# Patient Record
Sex: MALE | Race: BLACK OR AFRICAN AMERICAN | Employment: OTHER | URBAN - METROPOLITAN AREA
[De-identification: names, ages, dates, MRNs, and addresses within clinical notes are randomized per-mention and may not be internally consistent; named-entity substitution may affect disease eponyms.]

---

## 2018-01-01 ENCOUNTER — APPOINTMENT (OUTPATIENT)
Dept: CT IMAGING | Age: 83
DRG: 987 | End: 2018-01-01
Payer: MEDICARE

## 2018-01-01 ENCOUNTER — HOSPITAL ENCOUNTER (INPATIENT)
Age: 83
LOS: 2 days | DRG: 987 | End: 2018-08-13
Attending: EMERGENCY MEDICINE | Admitting: SURGERY
Payer: MEDICARE

## 2018-01-01 ENCOUNTER — APPOINTMENT (OUTPATIENT)
Dept: GENERAL RADIOLOGY | Age: 83
DRG: 987 | End: 2018-01-01
Payer: MEDICARE

## 2018-01-01 VITALS
DIASTOLIC BLOOD PRESSURE: 59 MMHG | TEMPERATURE: 98.1 F | SYSTOLIC BLOOD PRESSURE: 118 MMHG | OXYGEN SATURATION: 97 % | HEART RATE: 74 BPM | RESPIRATION RATE: 27 BRPM | WEIGHT: 207.45 LBS

## 2018-01-01 DIAGNOSIS — Z86.74 HISTORY OF CARDIAC ARREST: ICD-10-CM

## 2018-01-01 DIAGNOSIS — S12.9XXA CLOSED FRACTURE OF CERVICAL VERTEBRA, UNSPECIFIED CERVICAL VERTEBRAL LEVEL, INITIAL ENCOUNTER (HCC): Primary | ICD-10-CM

## 2018-01-01 LAB
-: NORMAL
ABO/RH: NORMAL
ABSOLUTE EOS #: <0.03 K/UL (ref 0–0.44)
ABSOLUTE EOS #: <0.03 K/UL (ref 0–0.44)
ABSOLUTE IMMATURE GRANULOCYTE: 0.05 K/UL (ref 0–0.3)
ABSOLUTE IMMATURE GRANULOCYTE: 0.08 K/UL (ref 0–0.3)
ABSOLUTE LYMPH #: 0.81 K/UL (ref 1.1–3.7)
ABSOLUTE LYMPH #: 0.85 K/UL (ref 1.1–3.7)
ABSOLUTE MONO #: 0.63 K/UL (ref 0.1–1.2)
ABSOLUTE MONO #: 0.85 K/UL (ref 0.1–1.2)
ACT TEG: 113 SEC (ref 86–118)
ALLEN TEST: ABNORMAL
ALLEN TEST: POSITIVE
AMORPHOUS: NORMAL
AMPHETAMINE SCREEN URINE: NEGATIVE
ANGLE, RAPID TEG: 78.5 DEG (ref 64–80)
ANION GAP SERPL CALCULATED.3IONS-SCNC: 12 MMOL/L (ref 9–17)
ANION GAP SERPL CALCULATED.3IONS-SCNC: 8 MMOL/L (ref 9–17)
ANION GAP SERPL CALCULATED.3IONS-SCNC: 9 MMOL/L (ref 9–17)
ANTIBODY SCREEN: NEGATIVE
ARM BAND NUMBER: NORMAL
BACTERIA: NORMAL
BARBITURATE SCREEN URINE: NEGATIVE
BASOPHILS # BLD: 0 % (ref 0–2)
BASOPHILS # BLD: 0 % (ref 0–2)
BASOPHILS ABSOLUTE: 0.03 K/UL (ref 0–0.2)
BASOPHILS ABSOLUTE: <0.03 K/UL (ref 0–0.2)
BENZODIAZEPINE SCREEN, URINE: POSITIVE
BILIRUBIN URINE: NEGATIVE
BLD PROD TYP BPU: NORMAL
BLOOD BANK SPECIMEN: ABNORMAL
BNP INTERPRETATION: NORMAL
BUN BLDV-MCNC: 13 MG/DL (ref 8–23)
BUN BLDV-MCNC: 20 MG/DL (ref 8–23)
BUN BLDV-MCNC: 8 MG/DL (ref 8–23)
BUN/CREAT BLD: ABNORMAL (ref 9–20)
BUN/CREAT BLD: ABNORMAL (ref 9–20)
BUPRENORPHINE URINE: ABNORMAL
CALCIUM SERPL-MCNC: 8 MG/DL (ref 8.6–10.4)
CALCIUM SERPL-MCNC: 8.3 MG/DL (ref 8.6–10.4)
CANNABINOID SCREEN URINE: NEGATIVE
CARBOXYHEMOGLOBIN: ABNORMAL % (ref 0–5)
CASTS UA: NORMAL /LPF (ref 0–8)
CHLORIDE BLD-SCNC: 103 MMOL/L (ref 98–107)
CHLORIDE BLD-SCNC: 106 MMOL/L (ref 98–107)
CHLORIDE BLD-SCNC: 108 MMOL/L (ref 98–107)
CO2: 21 MMOL/L (ref 20–31)
CO2: 24 MMOL/L (ref 20–31)
CO2: 25 MMOL/L (ref 20–31)
COCAINE METABOLITE, URINE: NEGATIVE
COLOR: YELLOW
COMMENT UA: ABNORMAL
CREAT SERPL-MCNC: 0.75 MG/DL (ref 0.7–1.2)
CREAT SERPL-MCNC: 0.82 MG/DL (ref 0.7–1.2)
CREAT SERPL-MCNC: 0.94 MG/DL (ref 0.7–1.2)
CROSSMATCH RESULT: NORMAL
CRYSTALS, UA: NORMAL /HPF
DIFFERENTIAL TYPE: ABNORMAL
DIFFERENTIAL TYPE: ABNORMAL
DISPENSE STATUS BLOOD BANK: NORMAL
EKG ATRIAL RATE: 115 BPM
EKG Q-T INTERVAL: 344 MS
EKG QRS DURATION: 78 MS
EKG QTC CALCULATION (BAZETT): 467 MS
EKG R AXIS: 72 DEGREES
EKG T AXIS: 36 DEGREES
EKG VENTRICULAR RATE: 111 BPM
EOSINOPHILS RELATIVE PERCENT: 0 % (ref 1–4)
EOSINOPHILS RELATIVE PERCENT: 0 % (ref 1–4)
EPITHELIAL CELLS UA: NORMAL /HPF (ref 0–5)
EPL-TEG: 0.1 % (ref 0–15)
ETHANOL PERCENT: <0.01 %
ETHANOL: <10 MG/DL
EXPIRATION DATE: NORMAL
FIO2: 100
FIO2: 30
FIO2: 80
FIO2: ABNORMAL
GFR AFRICAN AMERICAN: >60 ML/MIN
GFR AFRICAN AMERICAN: >60 ML/MIN
GFR AFRICAN AMERICAN: ABNORMAL ML/MIN
GFR NON-AFRICAN AMERICAN: >60 ML/MIN
GFR NON-AFRICAN AMERICAN: >60 ML/MIN
GFR NON-AFRICAN AMERICAN: ABNORMAL ML/MIN
GFR SERPL CREATININE-BSD FRML MDRD: ABNORMAL ML/MIN/{1.73_M2}
GLUCOSE BLD-MCNC: 110 MG/DL (ref 70–99)
GLUCOSE BLD-MCNC: 120 MG/DL (ref 70–99)
GLUCOSE BLD-MCNC: 148 MG/DL (ref 70–99)
GLUCOSE URINE: NEGATIVE
HCO3 VENOUS: 22 MMOL/L (ref 24–30)
HCT VFR BLD CALC: 32.9 % (ref 40.7–50.3)
HCT VFR BLD CALC: 35.7 % (ref 40.7–50.3)
HCT VFR BLD CALC: 37.9 % (ref 40.7–50.3)
HEMOGLOBIN: 10.4 G/DL (ref 13–17)
HEMOGLOBIN: 11.2 G/DL (ref 13–17)
HEMOGLOBIN: 12.1 G/DL (ref 13–17)
HEPARIN THERAPY: ABNORMAL
IMMATURE GRANULOCYTES: 1 %
IMMATURE GRANULOCYTES: 1 %
INR BLD: 1
KETONES, URINE: NEGATIVE
KINETICS RAPID TEG: 0.9 MIN (ref 1–2)
LEUKOCYTE ESTERASE, URINE: NEGATIVE
LV EF: 55 %
LVEF MODALITY: NORMAL
LY30 (LYSIS) TEG: 0.1 % (ref 0–8)
LYMPHOCYTES # BLD: 6 % (ref 24–43)
LYMPHOCYTES # BLD: 9 % (ref 24–43)
MA(MAX CLOT) RAPID TEG: 72.3 MM (ref 52–71)
MCH RBC QN AUTO: 28.8 PG (ref 25.2–33.5)
MCH RBC QN AUTO: 29.2 PG (ref 25.2–33.5)
MCH RBC QN AUTO: 29.9 PG (ref 25.2–33.5)
MCHC RBC AUTO-ENTMCNC: 31.4 G/DL (ref 28.4–34.8)
MCHC RBC AUTO-ENTMCNC: 31.6 G/DL (ref 28.4–34.8)
MCHC RBC AUTO-ENTMCNC: 31.9 G/DL (ref 28.4–34.8)
MCV RBC AUTO: 91.1 FL (ref 82.6–102.9)
MCV RBC AUTO: 91.3 FL (ref 82.6–102.9)
MCV RBC AUTO: 95.2 FL (ref 82.6–102.9)
MDMA URINE: ABNORMAL
METHADONE SCREEN, URINE: NEGATIVE
METHAMPHETAMINE, URINE: ABNORMAL
METHEMOGLOBIN: ABNORMAL % (ref 0–1.5)
MODE: ABNORMAL
MONOCYTES # BLD: 7 % (ref 3–12)
MONOCYTES # BLD: 7 % (ref 3–12)
MRSA, DNA, NASAL: NORMAL
MUCUS: NORMAL
MYOGLOBIN: 315 NG/ML (ref 28–72)
MYOGLOBIN: 360 NG/ML (ref 28–72)
MYOGLOBIN: 449 NG/ML (ref 28–72)
NEGATIVE BASE EXCESS, ART: 1 (ref 0–2)
NEGATIVE BASE EXCESS, ART: 3 (ref 0–2)
NEGATIVE BASE EXCESS, ART: ABNORMAL (ref 0–2)
NEGATIVE BASE EXCESS, VEN: ABNORMAL MMOL/L (ref 0–2)
NITRITE, URINE: NEGATIVE
NOTIFICATION TIME: ABNORMAL
NOTIFICATION: ABNORMAL
NRBC AUTOMATED: 0 PER 100 WBC
O2 DEVICE/FLOW/%: ABNORMAL
O2 SAT, VEN: ABNORMAL % (ref 60–85)
OPIATES, URINE: NEGATIVE
OTHER OBSERVATIONS UA: NORMAL
OXYCODONE SCREEN URINE: NEGATIVE
OXYHEMOGLOBIN: ABNORMAL % (ref 95–98)
PARTIAL THROMBOPLASTIN TIME: 17 SEC (ref 20.5–30.5)
PATIENT TEMP: 37
PATIENT TEMP: ABNORMAL
PCO2, VEN, TEMP ADJ: ABNORMAL MMHG (ref 39–55)
PCO2, VEN: 41.3 (ref 39–55)
PDW BLD-RTO: 15.4 % (ref 11.8–14.4)
PDW BLD-RTO: 16.4 % (ref 11.8–14.4)
PDW BLD-RTO: 16.8 % (ref 11.8–14.4)
PEEP/CPAP: ABNORMAL
PH UA: 5.5 (ref 5–8)
PH VENOUS: 7.35 (ref 7.32–7.42)
PH, VEN, TEMP ADJ: ABNORMAL (ref 7.32–7.42)
PHENCYCLIDINE, URINE: NEGATIVE
PLATELET # BLD: 102 K/UL (ref 138–453)
PLATELET # BLD: 150 K/UL (ref 138–453)
PLATELET # BLD: 174 K/UL (ref 138–453)
PLATELET ESTIMATE: ABNORMAL
PLATELET ESTIMATE: ABNORMAL
PMV BLD AUTO: 10.1 FL (ref 8.1–13.5)
PMV BLD AUTO: 10.3 FL (ref 8.1–13.5)
PMV BLD AUTO: 9.9 FL (ref 8.1–13.5)
PO2, VEN, TEMP ADJ: ABNORMAL MMHG (ref 30–50)
PO2, VEN: 69 (ref 30–50)
POC HCO3: 23.8 MMOL/L (ref 21–28)
POC HCO3: 25.4 MMOL/L (ref 21–28)
POC HCO3: 26.2 MMOL/L (ref 21–28)
POC O2 SATURATION: 100 % (ref 94–98)
POC O2 SATURATION: 94 % (ref 94–98)
POC O2 SATURATION: 99 % (ref 94–98)
POC PCO2 TEMP: ABNORMAL MM HG
POC PCO2: 36.6 MM HG (ref 35–48)
POC PCO2: 48.6 MM HG (ref 35–48)
POC PCO2: 49.5 MM HG (ref 35–48)
POC PH TEMP: ABNORMAL
POC PH: 7.29 (ref 7.35–7.45)
POC PH: 7.33 (ref 7.35–7.45)
POC PH: 7.46 (ref 7.35–7.45)
POC PO2 TEMP: ABNORMAL MM HG
POC PO2: 133.2 MM HG (ref 83–108)
POC PO2: 223.4 MM HG (ref 83–108)
POC PO2: 68 MM HG (ref 83–108)
POSITIVE BASE EXCESS, ART: 2 (ref 0–3)
POSITIVE BASE EXCESS, ART: ABNORMAL (ref 0–3)
POSITIVE BASE EXCESS, ART: ABNORMAL (ref 0–3)
POSITIVE BASE EXCESS, VEN: ABNORMAL MMOL/L (ref 0–2)
POTASSIUM SERPL-SCNC: 4.3 MMOL/L (ref 3.7–5.3)
POTASSIUM SERPL-SCNC: 4.7 MMOL/L (ref 3.7–5.3)
POTASSIUM SERPL-SCNC: 4.8 MMOL/L (ref 3.7–5.3)
PRO-BNP: 257 PG/ML
PROPOXYPHENE, URINE: ABNORMAL
PROTEIN UA: ABNORMAL
PROTHROMBIN TIME: 10.9 SEC (ref 9–12)
PSV: ABNORMAL
PT. POSITION: ABNORMAL
RBC # BLD: 3.61 M/UL (ref 4.21–5.77)
RBC # BLD: 3.75 M/UL (ref 4.21–5.77)
RBC # BLD: 4.15 M/UL (ref 4.21–5.77)
RBC # BLD: ABNORMAL 10*6/UL
RBC # BLD: ABNORMAL 10*6/UL
RBC UA: NORMAL /HPF (ref 0–4)
REACTION TIME RAPID TEG: 0.7 MIN (ref 0–1)
RENAL EPITHELIAL, UA: NORMAL /HPF
RESPIRATORY RATE: ABNORMAL
SAMPLE SITE: ABNORMAL
SEG NEUTROPHILS: 83 % (ref 36–65)
SEG NEUTROPHILS: 86 % (ref 36–65)
SEGMENTED NEUTROPHILS ABSOLUTE COUNT: 11.19 K/UL (ref 1.5–8.1)
SEGMENTED NEUTROPHILS ABSOLUTE COUNT: 7.68 K/UL (ref 1.5–8.1)
SET RATE: ABNORMAL
SODIUM BLD-SCNC: 136 MMOL/L (ref 135–144)
SODIUM BLD-SCNC: 139 MMOL/L (ref 135–144)
SODIUM BLD-SCNC: 141 MMOL/L (ref 135–144)
SPECIFIC GRAVITY UA: 1.08 (ref 1–1.03)
SPECIMEN DESCRIPTION: NORMAL
TCO2 (CALC), ART: 25 MMOL/L (ref 22–29)
TCO2 (CALC), ART: 27 MMOL/L (ref 22–29)
TCO2 (CALC), ART: 27 MMOL/L (ref 22–29)
TEG COMMENT: ABNORMAL
TEST INFORMATION: ABNORMAL
TEXT FOR RESPIRATORY: ABNORMAL
TOTAL CK: 292 U/L (ref 39–308)
TOTAL CK: 302 U/L (ref 39–308)
TOTAL CK: 351 U/L (ref 39–308)
TOTAL HB: ABNORMAL G/DL (ref 12–16)
TOTAL RATE: ABNORMAL
TRANSFUSION STATUS: NORMAL
TRICHOMONAS: NORMAL
TRICYCLIC ANTIDEPRESSANTS, UR: ABNORMAL
TROPONIN INTERP: NORMAL
TROPONIN T: <0.03 NG/ML
TURBIDITY: CLEAR
UNIT DIVISION: 0
UNIT NUMBER: NORMAL
UNIT TAG COMMENT: NORMAL
URINE HGB: ABNORMAL
UROBILINOGEN, URINE: NORMAL
VT: ABNORMAL
WBC # BLD: 13 K/UL (ref 3.5–11.3)
WBC # BLD: 13.3 K/UL (ref 3.5–11.3)
WBC # BLD: 9.2 K/UL (ref 3.5–11.3)
WBC # BLD: ABNORMAL 10*3/UL
WBC # BLD: ABNORMAL 10*3/UL
WBC UA: NORMAL /HPF (ref 0–5)
YEAST: NORMAL

## 2018-01-01 PROCEDURE — S0028 INJECTION, FAMOTIDINE, 20 MG: HCPCS | Performed by: STUDENT IN AN ORGANIZED HEALTH CARE EDUCATION/TRAINING PROGRAM

## 2018-01-01 PROCEDURE — 82803 BLOOD GASES ANY COMBINATION: CPT

## 2018-01-01 PROCEDURE — 6360000004 HC RX CONTRAST MEDICATION: Performed by: EMERGENCY MEDICINE

## 2018-01-01 PROCEDURE — 82947 ASSAY GLUCOSE BLOOD QUANT: CPT

## 2018-01-01 PROCEDURE — 84703 CHORIONIC GONADOTROPIN ASSAY: CPT

## 2018-01-01 PROCEDURE — 6360000002 HC RX W HCPCS: Performed by: FAMILY MEDICINE

## 2018-01-01 PROCEDURE — 83874 ASSAY OF MYOGLOBIN: CPT

## 2018-01-01 PROCEDURE — 93325 DOPPLER ECHO COLOR FLOW MAPG: CPT

## 2018-01-01 PROCEDURE — 70450 CT HEAD/BRAIN W/O DYE: CPT

## 2018-01-01 PROCEDURE — 84484 ASSAY OF TROPONIN QUANT: CPT

## 2018-01-01 PROCEDURE — 72131 CT LUMBAR SPINE W/O DYE: CPT

## 2018-01-01 PROCEDURE — 94003 VENT MGMT INPAT SUBQ DAY: CPT

## 2018-01-01 PROCEDURE — 94762 N-INVAS EAR/PLS OXIMTRY CONT: CPT

## 2018-01-01 PROCEDURE — 82805 BLOOD GASES W/O2 SATURATION: CPT

## 2018-01-01 PROCEDURE — 85210 CLOT FACTOR II PROTHROM SPEC: CPT

## 2018-01-01 PROCEDURE — 2580000003 HC RX 258: Performed by: STUDENT IN AN ORGANIZED HEALTH CARE EDUCATION/TRAINING PROGRAM

## 2018-01-01 PROCEDURE — 86850 RBC ANTIBODY SCREEN: CPT

## 2018-01-01 PROCEDURE — 71045 X-RAY EXAM CHEST 1 VIEW: CPT

## 2018-01-01 PROCEDURE — 36620 INSERTION CATHETER ARTERY: CPT

## 2018-01-01 PROCEDURE — 74177 CT ABD & PELVIS W/CONTRAST: CPT

## 2018-01-01 PROCEDURE — 85610 PROTHROMBIN TIME: CPT

## 2018-01-01 PROCEDURE — G0390 TRAUMA RESPONS W/HOSP CRITI: HCPCS

## 2018-01-01 PROCEDURE — 86927 PLASMA FRESH FROZEN: CPT

## 2018-01-01 PROCEDURE — 6370000000 HC RX 637 (ALT 250 FOR IP): Performed by: STUDENT IN AN ORGANIZED HEALTH CARE EDUCATION/TRAINING PROGRAM

## 2018-01-01 PROCEDURE — 82550 ASSAY OF CK (CPK): CPT

## 2018-01-01 PROCEDURE — 36430 TRANSFUSION BLD/BLD COMPNT: CPT

## 2018-01-01 PROCEDURE — 85027 COMPLETE CBC AUTOMATED: CPT

## 2018-01-01 PROCEDURE — 86920 COMPATIBILITY TEST SPIN: CPT

## 2018-01-01 PROCEDURE — 80048 BASIC METABOLIC PNL TOTAL CA: CPT

## 2018-01-01 PROCEDURE — 94002 VENT MGMT INPAT INIT DAY: CPT

## 2018-01-01 PROCEDURE — 99285 EMERGENCY DEPT VISIT HI MDM: CPT

## 2018-01-01 PROCEDURE — 84520 ASSAY OF UREA NITROGEN: CPT

## 2018-01-01 PROCEDURE — 94770 HC ETCO2 MONITOR DAILY: CPT

## 2018-01-01 PROCEDURE — 99221 1ST HOSP IP/OBS SF/LOW 40: CPT | Performed by: NEUROLOGICAL SURGERY

## 2018-01-01 PROCEDURE — 0W9B30Z DRAINAGE OF LEFT PLEURAL CAVITY WITH DRAINAGE DEVICE, PERCUTANEOUS APPROACH: ICD-10-PCS | Performed by: SURGERY

## 2018-01-01 PROCEDURE — 0W9930Z DRAINAGE OF RIGHT PLEURAL CAVITY WITH DRAINAGE DEVICE, PERCUTANEOUS APPROACH: ICD-10-PCS | Performed by: SURGERY

## 2018-01-01 PROCEDURE — 36415 COLL VENOUS BLD VENIPUNCTURE: CPT

## 2018-01-01 PROCEDURE — 02H633Z INSERTION OF INFUSION DEVICE INTO RIGHT ATRIUM, PERCUTANEOUS APPROACH: ICD-10-PCS | Performed by: SURGERY

## 2018-01-01 PROCEDURE — 2000000000 HC ICU R&B

## 2018-01-01 PROCEDURE — 80307 DRUG TEST PRSMV CHEM ANLYZR: CPT

## 2018-01-01 PROCEDURE — 6360000002 HC RX W HCPCS: Performed by: STUDENT IN AN ORGANIZED HEALTH CARE EDUCATION/TRAINING PROGRAM

## 2018-01-01 PROCEDURE — 2500000003 HC RX 250 WO HCPCS: Performed by: STUDENT IN AN ORGANIZED HEALTH CARE EDUCATION/TRAINING PROGRAM

## 2018-01-01 PROCEDURE — 72128 CT CHEST SPINE W/O DYE: CPT

## 2018-01-01 PROCEDURE — 36600 WITHDRAWAL OF ARTERIAL BLOOD: CPT

## 2018-01-01 PROCEDURE — 06HY33Z INSERTION OF INFUSION DEVICE INTO LOWER VEIN, PERCUTANEOUS APPROACH: ICD-10-PCS | Performed by: SURGERY

## 2018-01-01 PROCEDURE — 5A1945Z RESPIRATORY VENTILATION, 24-96 CONSECUTIVE HOURS: ICD-10-PCS | Performed by: SURGERY

## 2018-01-01 PROCEDURE — 99497 ADVNCD CARE PLAN 30 MIN: CPT | Performed by: FAMILY MEDICINE

## 2018-01-01 PROCEDURE — 80051 ELECTROLYTE PANEL: CPT

## 2018-01-01 PROCEDURE — 82565 ASSAY OF CREATININE: CPT

## 2018-01-01 PROCEDURE — 0W9G0ZX DRAINAGE OF PERITONEAL CAVITY, OPEN APPROACH, DIAGNOSTIC: ICD-10-PCS | Performed by: SURGERY

## 2018-01-01 PROCEDURE — 85025 COMPLETE CBC W/AUTO DIFF WBC: CPT

## 2018-01-01 PROCEDURE — 32551 INSERTION OF CHEST TUBE: CPT

## 2018-01-01 PROCEDURE — P9016 RBC LEUKOCYTES REDUCED: HCPCS

## 2018-01-01 PROCEDURE — 93320 DOPPLER ECHO COMPLETE: CPT

## 2018-01-01 PROCEDURE — 85730 THROMBOPLASTIN TIME PARTIAL: CPT

## 2018-01-01 PROCEDURE — 85390 FIBRINOLYSINS SCREEN I&R: CPT

## 2018-01-01 PROCEDURE — 71260 CT THORAX DX C+: CPT

## 2018-01-01 PROCEDURE — 81001 URINALYSIS AUTO W/SCOPE: CPT

## 2018-01-01 PROCEDURE — 6360000002 HC RX W HCPCS

## 2018-01-01 PROCEDURE — 93308 TTE F-UP OR LMTD: CPT

## 2018-01-01 PROCEDURE — 36556 INSERT NON-TUNNEL CV CATH: CPT

## 2018-01-01 PROCEDURE — 87641 MR-STAPH DNA AMP PROBE: CPT

## 2018-01-01 PROCEDURE — 36569 INSJ PICC 5 YR+ W/O IMAGING: CPT

## 2018-01-01 PROCEDURE — 83880 ASSAY OF NATRIURETIC PEPTIDE: CPT

## 2018-01-01 PROCEDURE — 86901 BLOOD TYPING SEROLOGIC RH(D): CPT

## 2018-01-01 PROCEDURE — 70498 CT ANGIOGRAPHY NECK: CPT

## 2018-01-01 PROCEDURE — 93005 ELECTROCARDIOGRAM TRACING: CPT

## 2018-01-01 PROCEDURE — G0480 DRUG TEST DEF 1-7 CLASSES: HCPCS

## 2018-01-01 PROCEDURE — 72125 CT NECK SPINE W/O DYE: CPT

## 2018-01-01 PROCEDURE — 86900 BLOOD TYPING SEROLOGIC ABO: CPT

## 2018-01-01 PROCEDURE — P9017 PLASMA 1 DONOR FRZ W/IN 8 HR: HCPCS

## 2018-01-01 PROCEDURE — 99221 1ST HOSP IP/OBS SF/LOW 40: CPT | Performed by: FAMILY MEDICINE

## 2018-01-01 RX ORDER — SODIUM CHLORIDE, SODIUM LACTATE, POTASSIUM CHLORIDE, CALCIUM CHLORIDE 600; 310; 30; 20 MG/100ML; MG/100ML; MG/100ML; MG/100ML
INJECTION, SOLUTION INTRAVENOUS CONTINUOUS
Status: DISCONTINUED | OUTPATIENT
Start: 2018-01-01 | End: 2018-01-01

## 2018-01-01 RX ORDER — METOPROLOL SUCCINATE 25 MG/1
25 TABLET, EXTENDED RELEASE ORAL DAILY
COMMUNITY

## 2018-01-01 RX ORDER — DOPAMINE HYDROCHLORIDE 160 MG/100ML
2.5 INJECTION, SOLUTION INTRAVENOUS CONTINUOUS
Status: DISCONTINUED | OUTPATIENT
Start: 2018-01-01 | End: 2018-01-01

## 2018-01-01 RX ORDER — MORPHINE SULFATE 4 MG/ML
4 INJECTION, SOLUTION INTRAMUSCULAR; INTRAVENOUS
Status: DISCONTINUED | OUTPATIENT
Start: 2018-01-01 | End: 2018-01-01 | Stop reason: HOSPADM

## 2018-01-01 RX ORDER — CHLORHEXIDINE GLUCONATE 0.12 MG/ML
15 RINSE ORAL 2 TIMES DAILY
Status: DISCONTINUED | OUTPATIENT
Start: 2018-01-01 | End: 2018-01-01

## 2018-01-01 RX ORDER — ATROPINE SULFATE 10 MG/ML
2 SOLUTION/ DROPS OPHTHALMIC EVERY 4 HOURS PRN
Status: DISCONTINUED | OUTPATIENT
Start: 2018-01-01 | End: 2018-01-01 | Stop reason: HOSPADM

## 2018-01-01 RX ORDER — ACETAMINOPHEN 325 MG/1
650 TABLET ORAL EVERY 4 HOURS PRN
Status: DISCONTINUED | OUTPATIENT
Start: 2018-01-01 | End: 2018-01-01 | Stop reason: HOSPADM

## 2018-01-01 RX ORDER — SODIUM CHLORIDE, SODIUM LACTATE, POTASSIUM CHLORIDE, CALCIUM CHLORIDE 600; 310; 30; 20 MG/100ML; MG/100ML; MG/100ML; MG/100ML
INJECTION, SOLUTION INTRAVENOUS CONTINUOUS
Status: DISCONTINUED | OUTPATIENT
Start: 2018-01-01 | End: 2018-01-01 | Stop reason: HOSPADM

## 2018-01-01 RX ORDER — SODIUM CHLORIDE 0.9 % (FLUSH) 0.9 %
10 SYRINGE (ML) INJECTION PRN
Status: DISCONTINUED | OUTPATIENT
Start: 2018-01-01 | End: 2018-01-01 | Stop reason: HOSPADM

## 2018-01-01 RX ORDER — MORPHINE SULFATE 2 MG/ML
1 INJECTION, SOLUTION INTRAMUSCULAR; INTRAVENOUS
Status: DISCONTINUED | OUTPATIENT
Start: 2018-01-01 | End: 2018-01-01 | Stop reason: HOSPADM

## 2018-01-01 RX ORDER — DOPAMINE HYDROCHLORIDE 160 MG/100ML
INJECTION, SOLUTION INTRAVENOUS
Status: DISPENSED
Start: 2018-01-01 | End: 2018-01-01

## 2018-01-01 RX ORDER — GINSENG 100 MG
CAPSULE ORAL PRN
Status: DISCONTINUED | OUTPATIENT
Start: 2018-01-01 | End: 2018-01-01 | Stop reason: HOSPADM

## 2018-01-01 RX ORDER — ASPIRIN 81 MG/1
81 TABLET, CHEWABLE ORAL DAILY
COMMUNITY

## 2018-01-01 RX ORDER — SODIUM CHLORIDE 0.9 % (FLUSH) 0.9 %
10 SYRINGE (ML) INJECTION EVERY 12 HOURS SCHEDULED
Status: DISCONTINUED | OUTPATIENT
Start: 2018-01-01 | End: 2018-01-01 | Stop reason: HOSPADM

## 2018-01-01 RX ORDER — LORAZEPAM 2 MG/ML
0.5 INJECTION INTRAMUSCULAR
Status: DISCONTINUED | OUTPATIENT
Start: 2018-01-01 | End: 2018-01-01 | Stop reason: HOSPADM

## 2018-01-01 RX ORDER — FOLIC ACID 1 MG/1
1 TABLET ORAL DAILY
COMMUNITY

## 2018-01-01 RX ORDER — LORAZEPAM 2 MG/ML
1 INJECTION INTRAMUSCULAR
Status: DISCONTINUED | OUTPATIENT
Start: 2018-01-01 | End: 2018-01-01 | Stop reason: HOSPADM

## 2018-01-01 RX ORDER — MORPHINE SULFATE 2 MG/ML
2 INJECTION, SOLUTION INTRAMUSCULAR; INTRAVENOUS
Status: DISCONTINUED | OUTPATIENT
Start: 2018-01-01 | End: 2018-01-01 | Stop reason: HOSPADM

## 2018-01-01 RX ORDER — ONDANSETRON 2 MG/ML
4 INJECTION INTRAMUSCULAR; INTRAVENOUS EVERY 6 HOURS PRN
Status: DISCONTINUED | OUTPATIENT
Start: 2018-01-01 | End: 2018-01-01 | Stop reason: HOSPADM

## 2018-01-01 RX ORDER — GLYCOPYRROLATE 0.2 MG/ML
0.1 INJECTION INTRAMUSCULAR; INTRAVENOUS EVERY 4 HOURS PRN
Status: DISCONTINUED | OUTPATIENT
Start: 2018-01-01 | End: 2018-01-01 | Stop reason: HOSPADM

## 2018-01-01 RX ORDER — OXYCODONE HYDROCHLORIDE 5 MG/1
5 TABLET ORAL EVERY 4 HOURS PRN
Status: DISCONTINUED | OUTPATIENT
Start: 2018-01-01 | End: 2018-01-01 | Stop reason: HOSPADM

## 2018-01-01 RX ORDER — METOPROLOL TARTRATE 5 MG/5ML
2.5 INJECTION INTRAVENOUS EVERY 6 HOURS PRN
Status: DISCONTINUED | OUTPATIENT
Start: 2018-01-01 | End: 2018-01-01 | Stop reason: HOSPADM

## 2018-01-01 RX ORDER — MIDAZOLAM HYDROCHLORIDE 1 MG/ML
INJECTION INTRAMUSCULAR; INTRAVENOUS
Status: DISPENSED
Start: 2018-01-01 | End: 2018-01-01

## 2018-01-01 RX ORDER — FENTANYL CITRATE 50 UG/ML
INJECTION, SOLUTION INTRAMUSCULAR; INTRAVENOUS
Status: COMPLETED
Start: 2018-01-01 | End: 2018-01-01

## 2018-01-01 RX ORDER — FENTANYL CITRATE 50 UG/ML
50 INJECTION, SOLUTION INTRAMUSCULAR; INTRAVENOUS
Status: DISCONTINUED | OUTPATIENT
Start: 2018-01-01 | End: 2018-01-01 | Stop reason: HOSPADM

## 2018-01-01 RX ADMIN — CHLORHEXIDINE GLUCONATE 15 ML: 1.2 RINSE ORAL at 07:32

## 2018-01-01 RX ADMIN — Medication 2 MCG/MIN: at 21:35

## 2018-01-01 RX ADMIN — SODIUM CHLORIDE, POTASSIUM CHLORIDE, SODIUM LACTATE AND CALCIUM CHLORIDE: 600; 310; 30; 20 INJECTION, SOLUTION INTRAVENOUS at 17:20

## 2018-01-01 RX ADMIN — FAMOTIDINE 20 MG: 10 INJECTION, SOLUTION INTRAVENOUS at 22:39

## 2018-01-01 RX ADMIN — Medication 2 G: at 10:11

## 2018-01-01 RX ADMIN — SODIUM CHLORIDE, POTASSIUM CHLORIDE, SODIUM LACTATE AND CALCIUM CHLORIDE: 600; 310; 30; 20 INJECTION, SOLUTION INTRAVENOUS at 00:56

## 2018-01-01 RX ADMIN — SODIUM CHLORIDE, POTASSIUM CHLORIDE, SODIUM LACTATE AND CALCIUM CHLORIDE: 600; 310; 30; 20 INJECTION, SOLUTION INTRAVENOUS at 20:31

## 2018-01-01 RX ADMIN — DOPAMINE HYDROCHLORIDE 3 MCG/KG/MIN: 160 INJECTION, SOLUTION INTRAVENOUS at 18:51

## 2018-01-01 RX ADMIN — SODIUM CHLORIDE, POTASSIUM CHLORIDE, SODIUM LACTATE AND CALCIUM CHLORIDE: 600; 310; 30; 20 INJECTION, SOLUTION INTRAVENOUS at 05:49

## 2018-01-01 RX ADMIN — FENTANYL CITRATE 50 MCG: 50 INJECTION, SOLUTION INTRAMUSCULAR; INTRAVENOUS at 20:28

## 2018-01-01 RX ADMIN — FENTANYL CITRATE 50 MCG: 50 INJECTION, SOLUTION INTRAMUSCULAR; INTRAVENOUS at 05:37

## 2018-01-01 RX ADMIN — SODIUM CHLORIDE, POTASSIUM CHLORIDE, SODIUM LACTATE AND CALCIUM CHLORIDE: 600; 310; 30; 20 INJECTION, SOLUTION INTRAVENOUS at 19:24

## 2018-01-01 RX ADMIN — IOPAMIDOL 165 ML: 755 INJECTION, SOLUTION INTRAVENOUS at 16:49

## 2018-01-01 RX ADMIN — FENTANYL CITRATE 50 MCG: 50 INJECTION, SOLUTION INTRAMUSCULAR; INTRAVENOUS at 08:06

## 2018-01-01 RX ADMIN — FAMOTIDINE 20 MG: 10 INJECTION, SOLUTION INTRAVENOUS at 20:31

## 2018-01-01 RX ADMIN — CHLORHEXIDINE GLUCONATE 15 ML: 1.2 RINSE ORAL at 22:38

## 2018-01-01 RX ADMIN — SODIUM CHLORIDE, POTASSIUM CHLORIDE, SODIUM LACTATE AND CALCIUM CHLORIDE: 600; 310; 30; 20 INJECTION, SOLUTION INTRAVENOUS at 08:47

## 2018-01-01 RX ADMIN — MORPHINE SULFATE 2 MG: 2 INJECTION, SOLUTION INTRAMUSCULAR; INTRAVENOUS at 13:40

## 2018-01-01 RX ADMIN — Medication 10 ML: at 07:30

## 2018-01-01 RX ADMIN — Medication 2 G: at 05:51

## 2018-01-01 RX ADMIN — Medication 10 ML: at 20:32

## 2018-01-01 RX ADMIN — Medication 2 G: at 19:23

## 2018-01-01 RX ADMIN — Medication 10 ML: at 21:27

## 2018-01-01 RX ADMIN — SODIUM CHLORIDE, POTASSIUM CHLORIDE, SODIUM LACTATE AND CALCIUM CHLORIDE: 600; 310; 30; 20 INJECTION, SOLUTION INTRAVENOUS at 10:40

## 2018-01-01 RX ADMIN — DOPAMINE HYDROCHLORIDE 4 MCG/KG/MIN: 160 INJECTION, SOLUTION INTRAVENOUS at 19:22

## 2018-01-01 RX ADMIN — FAMOTIDINE 20 MG: 10 INJECTION, SOLUTION INTRAVENOUS at 07:31

## 2018-01-01 ASSESSMENT — PULMONARY FUNCTION TESTS
PIF_VALUE: 25
PIF_VALUE: 24
PIF_VALUE: 25
PIF_VALUE: 15
PIF_VALUE: 22
PIF_VALUE: 22
PIF_VALUE: 21
PIF_VALUE: 28
PIF_VALUE: 27
PIF_VALUE: 27
PIF_VALUE: 25

## 2018-01-01 ASSESSMENT — PAIN SCALES - GENERAL: PAINLEVEL_OUTOF10: 0
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
## 2018-08-11 NOTE — H&P
Trauma, Emergency and Critical Surgical Services                TRAUMA HISTORY AND PHYSICAL EXAMINATION  (V 2.0)    PATIENT NAME: Debi Aquino  YOB: 1878  MEDICAL RECORD NO. [id number]   DATE: 8/11/2018  PRIMARY CARE PHYSICIAN: No primary care provider on file. PATIENT EVALUATED AT THE REQUEST OF :       ACTIVATION   [x]Trauma Alert     [] Trauma Priority     []Trauma Consult. IMPRESSION:     Patient Active Problem List   Diagnosis    Fall       MEDICAL DECISION MAKING AND PLAN:     1. Neuro  -admit to neuro critical care, NS consulted   -follow up imaging   -maintain C-collar for C1/C2 fractures and odontoid fracture identified with imaging from outside facility     2. CV  -left sided femoral line   -dopamine gtt  -sp 3 units of PRBC and 4 units FFP     3. Pulm  -Chest X-ray was negative   -bilateral chest tubes set to wall suction, left sided chest tube had approx 100 ml at placement   -intubated 7.0 ET tube prior to presentation     4. Musculoskeletal   -pelvic binder in place    CONSULT SERVICES    [x] Neurosurgery     [] Orthopedic Surgery    [] Cardiothoracic     [] Facial Trauma    [] Plastic Surgery (Burn)    [] Pediatric Surgery     [] Internal Medicine    [] Pulmonary Medicine    [] Other:     HISTORY:     SOURCE OF INFORMATION  Patient information was obtained from patient. History/Exam limitations: mental status. INJURY SUMMARY      GENERAL DATA  Age 82 y.o.  male   Patient information was obtained from EMS personnel. History/Exam limitations: due to condition.   Patient presented to the Emergency Department Life flight   Injury Date: 8/11/2018   Approximate Injury Time:       Transport mode:   []Ambulance      [x] Helicopter     []Car       [] Other  Referring Hospital: [address] [place] [address], (e.g., home, farm, industry, street)  Specific Details of Location (e.g., bedroom, kitchen, garage): home   Type of Residence (if occurred in home setting) (e.g., apartment, mobile home, single family home): home    MECHANISM OF INJURY  []Motor Vehicle Collision  Specific vehicle type involved (e.g., sedan, minivan, SUV, pickup truck):   Collision with (e.g., type of vehicle, building, barn, ditch, tree):   []Single Vehicle Collision     []           []Fatality in Same Vehicle            []Passenger:      []Front Seat        []Rear Seat    []Unrestrained   []Lap Belt Only Restrained   [] Shoulder Belt Only Restrained  [] 3 Point Restrained    []Front Air Bag  []Side Air Bag  []Other Air Bag []Air Bag Not Deployed    []Ejected     []Rollover     []Extricated       CHILD:  []Booster Seat  []Infant Car Seat  [] Child Car Seat   []Motorcycle Collision Wearing Helmet     []Yes     []No    []Unknown  []Bicycle Collision Wearing Helmet     []Yes     []No    []Unknown  []Pedestrian Struck      [x]Fall    []From Standing     []From Height   Ft     [x]Down Stairs  []Assault  []Gunshot  Specify caliber / type of gun: ____________________________  []Stabbing  Specify weapon type, size: _____________________________  []Burn     []Flame   []Scald   []Electrical   []Chemical           []Contact   []Inhalation   []House fire  []Other ______________________________________________________  []Other protective devices used / worn ___________________________    HISTORY:   Patient transferred from [address] [place] [address]. Per the EMS personnel, the patient fell while walking up stairs. The patient loss consciousness. The patient takes warfarin at home. He went [facility] and imaging revealed he had a C1 and C2 fracture, and odontoind fracture. The patient also had gaze preference to the right. He lost pulses and he was subsequently intubated. CPR was initiated with ROSC. On arrival to the ED, the patient was hypotensive, and intubated. He was unresponsive.        Loss of Consciousness []No   [x]Yes Duration(min)    Total Fluids Given Prior To Arrival  cc    MEDICATIONS:   []  None     [x]  Information not available _______________________       RADIOLOGY  PLAIN FILMS  Ordered Findings  [x]CHEST []Normal   [] Preliminary findingsNormal heart size, Normal lungs, Normal mediastinum  []PELVIS  []Normal   [] Preliminary findings  EXTREMITIES      []RUE []Normal   _____________________    []LUE  []Normal   _____________________    []RLE []Normal   _____________________    []LLE  []Normal   _____________________  []OTHER []Normal   _____________________    CT SCANS  Ordered  []HEAD  []Normal   [x] Preliminary findings   []CHEST  []Normal   [] Preliminary findings  []ABD/PELVIS[]Normal  [] Preliminary findings  []FACE []Normal   [] Preliminary findings:   []C-SPINE  []Normal   [] Preliminary findings   []T-SPINE  []Normal   [] Preliminary findings  []L-SPINE  []Normal   [] Preliminary findings    []ANGIOGRAPHY  []Normal     [] Preliminary findings  []OTHER   []Normal     [] Preliminary findings:     LABS  Labs Reviewed   TRAUMA PANEL - Abnormal; Notable for the following:        Result Value    WBC 13.3 (*)     RBC 3.75 (*)     Hemoglobin 11.2 (*)     Hematocrit 35.7 (*)     RDW 15.4 (*)     Glucose 110 (*)     pO2, Santhosh 69.0 (*)     HCO3, Venous 22.0 (*)     PTT 17.0 (*)     All other components within normal limits   TEG, RAPID CITRATED   URINE DRUG SCREEN   URINALYSIS   TYPE AND SCREEN   PREPARE PLATELETS (CROSSMATCH)   PREPARE FRESH FROZEN PLASMA       PROCEDURES (SEE SEPARATE OPERATIVE REPORTS)  [x]CENTRAL LINE  []OROTRACHEAL INTUBATION  [x]CHEST TUBE R and L  []ARTERIAL LINE  []OTHER    Sydnee Mora MD  8/11/18, 4:08 PM                   Trauma Attending Attestation      I have reviewed the above [facility] note(s) and confirmed the key elements of the medical history and physical exam. I have seen and examined the pt. I have discussed the findings, established the care plan and recommendations with Resident, GCS RN, bedside nurse. I am signing this note for Dr. Jordan Camacho. He saw this pt agrees with the resident note.       Barbara Sandoval DO April  9/17/2018  4:45 PM

## 2018-08-11 NOTE — ED PROVIDER NOTES
[facility]  ED  eMERGENCY dEPARTMENT eNCOUnter   Attending Attestation     Pt Name: Joana Patterson  MRN: [id number]  [place] 1/1/1878  Date of evaluation: 8/11/18       Joana Patterson is a 82 y.o. male who presents with No chief complaint on file. History: Patient presents after a fall. Patient lost consciousness immediately and had CPR started on him. Patient was transferred here from outlying facility. Patient has known C1 and C2 fractures. Patient was noted to be hypotensive. No other signs of trauma except for injury to the frontal scalp. Exam: Patient has significant hematoma and abrasion to the right frontal scalp. Pupils are reactive minimally. Patient has roving eye movements especially to the left. Patient has breath sounds bilaterally. Abdomen is soft. Patient is not moving any extremities. Patient is not following commands. Patient is intubated. Patient is not currently sedated. Patient hypotensive. Will get appropriate scans. Trauma alert activated and patient seen by trauma immediately. Cordis placed in the left groin. BP labile initially. Plan for blood if not improved. I performed a history and physical examination of the patient and discussed management with the resident. I reviewed the residents note and agree with the documented findings and plan of care. Any areas of disagreement are noted on the chart. I was personally present for the key portions of any procedures. I have documented in the chart those procedures where I was not present during the key portions. I have personally reviewed all images and agree with the resident's interpretation. I have reviewed the emergency nurses triage note. I agree with the chief complaint, past medical history, past surgical history, allergies, medications, social and family history as documented unless otherwise noted below.  Documentation of the HPI, Physical Exam and Medical Decision Making

## 2018-08-11 NOTE — CONSULTS
Unknown     Spouse name: N/A    Number of children: N/A    Years of education: N/A     Occupational History    Not on file. Social History Main Topics    Smoking status: Not on file    Smokeless tobacco: Not on file    Alcohol use Not on file    Drug use: Unknown    Sexual activity: Not on file     Other Topics Concern    Not on file     Social History Narrative    No narrative on file       Family History:   No family history on file. Allergies:  Patient has no allergy information on record. Home Medications:  Prior to Admission medications    Not on File       Current Medications:   Current Facility-Administered Medications: iopamidol (ISOVUE-370) 76 % injection 165 mL, 165 mL, Intravenous, ONCE PRN  midazolam (VERSED) 2 MG/2ML injection, , ,   fentaNYL (SUBLIMAZE) 100 MCG/2ML injection, , ,   DOPamine (INTROPIN) 1.6-5 MG/ML-% infusion, , ,   prothrombin complex concentrate (human) (KCENTRA) infusion 1,000 Units, 1,000 Units, Intravenous, Once    REVIEW OF SYSTEMS:     Unable to obtain given patient is intubated and not alert  Review of systems otherwise negative. PHYSICAL EXAM:       Pulse 113   Resp 15       CONSTITUTIONAL:  Well developed, well nourished, Intubated but not sedated, not alert, in no acute distress. GCS 3, nontoxic. No dysarthria, no aphasia.     HEAD:  R forehead abrasion   EYES:  PERRLA, EOMI.   ENT:  moist mucous membranes   NECK:  supple, symmetric, in c-collar   BACK:  without midline tenderness, step-offs or deformities    LUNGS:  Equal air entry bilaterally   CARDIOVASCULAR:  normal s1 / s2   ABDOMEN:  Soft, no rigidity   NEUROLOGIC:  EYE OPENING     Spontaneous - 4 []       To voice - 3 []       To pain - 2 []       None - 1 [x]    VERBAL RESPONSE     Appropriate, oriented - 5 []       Dazed or confused - 4 []       Syllables, expletives - 3 []       Grunts - 2 []       None - 1 [x]    MOTOR RESPONSE     Spontaneous, command - 6 []       Localizes pain - 5 []

## 2018-08-12 NOTE — PLAN OF CARE
Problem: Nutrition  Goal: Optimal nutrition therapy  Outcome: Ongoing  Nutrition Problem: Inadequate oral intake  Intervention: Food and/or Nutrient Delivery: Continue NPO - Monitor for plan of care. Nutritional Goals: Meet % of estimated nutrition needs.

## 2018-08-12 NOTE — PROGRESS NOTES
ICU PROGRESS NOTE      PATIENT NAME: Smiley Villareal  MEDICAL RECORD NO. [id number]  DATE: 2018    PRIMARY CARE PHYSICIAN: No primary care provider on file. HD: # 1    ASSESSMENT    Patient Active Problem List   Diagnosis    Fall     Fall standing height +LOC, unstable C2 fx and L gaze deviation, ASA daily, Hx afib and lung cancer and dementia  S/p DPL, b/l chest tubes ij trauma bay     MEDICAL DECISION MAKING AND PLAN    1. Neuro- pain control fentanyl 50mcg Q1h PRN,    -NS following no acute surgical intervention at this time, TLS clear maintain c-spine per NS, HOB 30 degrees, hold all antiplatelets and AC at this time, await any further recs   2. CV- NSR with one bout of a-fib noted, troponin's x3 negative, cardiology consulted appreciate recs, lev[address]hed at [address] currently and weaning   3. Pulm- 40%/5/18/570, Abg 7.326/48.6/223/25.4, continue to wean, b/l Chest tubes with minimal output, L 50ml, R 25ml no air leak for b/l tubes, f/u am chest xray     4. GI- s/p DPL negative, NPO, OGT LIWS, IV pepcid  5. Renal- 125ml/hr LR, replace electrolytes PRN, CK/TESFAYE stable, Cr .82, K+4.7   6. Heme- 13 WBC, Hgb 12.1, Tmax 37.1, ancef 2g Q8H, received K centra, s/p 3uPRBC, 4uFFP  7. SCDs while in bed  8. Dispo- continued family discussion, pt was previously a DNR CC pt and has known lung Ca and opted for no treatment, DNR-CCA at this time per pt's POA/wife      CHECKLIST    RASS: -4  RESTRAINTS: b/l wrist soft  IVF: LR  NUTRITION: none  ANTIBIOTICS: ancef  GI: pepcid   DVT: none  GLYCEMIC CONTROL: none  HOB >45: no, 30 degrees per NS     SUBJECTIVE    Smiley Villareal  Seen and examined. Weaning from pressor support. No purposeful movements or spontaneous movements noted. No acute events overnight.  UOP marginal to adequate, .4ml/kg/hr       OBJECTIVE  VITALS: Temp: Temp: 98.8 °F (37.1 °C)Temp  Av.7 °F (37.1 °C)  Min: 98.4 °F (36.9 °C)  Max: 98.8 °F (79.3 °C) BP Systolic (66SOE), XPX:838 , Min:78 , Max:140 mastoid air cells demonstrate no acute abnormality. SOFT TISSUES/SKULL:  Hematoma overlying the right parietal bone. No acute intracranial abnormality. Ct Chest W Contrast    Result Date: 8/11/2018  EXAMINATION: CT OF THE CHEST WITH CONTRAST 8/11/2018 4:50 pm TECHNIQUE: CT of the chest was performed with the administration of intravenous contrast. Multiplanar reformatted images are provided for review. Dose modulation, iterative reconstruction, and/or weight based adjustment of the mA/kV was utilized to reduce the radiation dose to as low as reasonably achievable. COMPARISON: None. HISTORY: ORDERING SYSTEM PROVIDED HISTORY: trauma FINDINGS: Mediastinum:  A 2.1 cm low-attenuation right thyroid lobe nodule. No mediastinal or hilar masses. The thoracic aorta is intact. Atherosclerotic changes. No abnormal fluid collections seen in the mediastinum. No pericardial effusion. Lungs/pleura: Emphysematous changes seen in both lungs. Bilateral chest tubes in place. No pneumothorax. Consolidation noted in the left lower lobe and in the right upper lobe. No endobronchial lesion. No pleural effusion. Upper Abdomen: Will be described on the CT of the abdomen. Soft Tissues/Bones: Fractures of the right 2nd through 7th ribs anteriorly. Fractures of the left 2nd through 7th ribs anteriorly as well. Degenerative changes throughout the spine. No spinal fractures. 1. No aortic injury. 2. Consolidation in the left lower lobe and right upper lobe could represent pulmonary contusions. Alternatively, this may be related to malignancy as a history of lung carcinoma has been reported. 3. Bilateral chest tubes in place. No pneumothorax. 4. Fractures of the 2nd through 7th ribs anteriorly, bilaterally. 5. A 2.1 cm low-attenuation thyroid nodule in the right lobe. Based on bench marked recommendation below, a thyroid ultrasound should be considered when the patient is able.   However, a history of lung carcinoma has been reported, clinical correlation is recommended RECOMMENDATIONS: Managing Incidental Thyroid Nodule Detected at CT or MRI or US 1. Further evaluation by thyroid Ultrasound recommended for these incidental nodules: Patient Age 25 years or less - Nodule of any size Patient Age 21-27 years old - Nodule 1 cm in size or greater Patient Age 28 years or more - Nodule 1.5 cm in size or greater 2. Follow up thyroid ultrasound also recommend in these scenarios - Solitary nodule with high risk imaging features (locally invasive nodule or suspicious lymph nodes) - Heterogeneous, enlarged thyroid gland. - Increased uptake on PET 3. No further imaging is recommended in the following scenarios - Any nodule not meeting above criteria. - Those patients with limited life expectancy or significant co-morbidities. Note: These recommendations do not apply to pts. w/ increased risk for thyroid cancer or pts. with symptomatic thyroid disease. ________________________________________________________________ Recommendations for f/u of Incidental Thyroid Nodules (ITN) found on CT, MR, NM and Extrathyroidal US are based upon the ACR white paper and [facility] 3-tiered system for managing ITNs: J Am Romario Radiol. 2015 Feb;12(2): 143-50     Ct Cervical Spine Wo Contrast    Result Date: 8/11/2018  EXAMINATION: CT OF THE CERVICAL SPINE WITHOUT CONTRAST 8/11/2018 4:34 pm TECHNIQUE: CT of the cervical spine was performed without the administration of intravenous contrast. Multiplanar reformatted images are provided for review. Dose modulation, iterative reconstruction, and/or weight based adjustment of the mA/kV was utilized to reduce the radiation dose to as low as reasonably achievable. COMPARISON: None. HISTORY: ORDERING SYSTEM PROVIDED HISTORY: trauma FINDINGS: BONES/ALIGNMENT: Fracture through the base of the dens of C2. There is approximate 1 cm displacement of the dens compared to the body of C2. No other fractures are identified.   The maintained. No osseous destructive lesion is seen. DEGENERATIVE CHANGES: Multilevel degenerative changes with canal stenosis at L2-L3 due to disc bulging combined with ligamentum flava thickening and facet joint arthropathy. SOFT TISSUES/RETROPERITONEUM: No paraspinal mass is seen. 1. No acute lumbar spine abnormality 2. Multilevel degenerative changes, evidence for canal stenosis at L2-L3     Ct Abdomen Pelvis W Iv Contrast Additional Contrast? None    Result Date: 8/11/2018  EXAMINATION: CT OF THE ABDOMEN AND PELVIS WITH CONTRAST 8/11/2018 4:50 pm TECHNIQUE: CT of the abdomen and pelvis was performed with the administration of intravenous contrast. Multiplanar reformatted images are provided for review. Dose modulation, iterative reconstruction, and/or weight based adjustment of the mA/kV was utilized to reduce the radiation dose to as low as reasonably achievable. COMPARISON: None. HISTORY: ORDERING SYSTEM PROVIDED HISTORY: trauma TECHNOLOGIST PROVIDED HISTORY: Additional Contrast?->None FINDINGS: Lower Chest: Described on the chest CT Organs: Biliary gas noted. No other focal hepatic abnormality. The gallbladder appears normal.  The kidneys, adrenal glands, spleen and pancreas appear unremarkable. GI/Bowel: Colonic diverticulosis. No evidence for diverticulitis. Normal appearing appendix. The bowel loops are not dilated. No focal bowel wall thickening. Pelvis: There is small amount of free fluid. Conde catheter in the bladder. No bladder masses. Peritoneum/Retroperitoneum: Small amount of free abdominal fluid adjacent to the liver and spleen. Atherosclerotic changes. No adenopathy. No extraluminal gas. Bones/Soft Tissues: No soft tissue hernia. No acute fractures. No lytic or blastic lesions. Degenerative changes throughout the spine. 1. Small amount of free fluid in the abdomen and pelvis. However, no solid organ injury is appreciated 2. Biliary gas noted.   No evidence for previous

## 2018-08-12 NOTE — PROGRESS NOTES
[address] [id number]  Speech Language Pathology    Date: 8/12/2018  Patient Name: Ce Toney  YOB: 1935   AGE: [de-identified] y.o.   MRN: [id number]        Patient Not Available for Speech Therapy     Due to:  [] Testing  [] Hemodialysis  [] Cancelled by RN  [] Surgery   [x] Intubation/Sedation/Pain Medication  [] Medical instability  [] Other:    Next scheduled treatment:  8/13/18 as appropriate  Completed by: MIKE Parr

## 2018-08-12 NOTE — PROGRESS NOTES
Patient placed on cpap5/psv8 to assess respiratory effort at request of RN. Returned to [facility] after 2 min. Due to increased respiratory rate 35 and low .

## 2018-08-12 NOTE — PROGRESS NOTES
history of lung carcinoma has been reported. 3. Bilateral chest tubes in place. No pneumothorax. 4. Fractures of the 2nd through 7th ribs anteriorly, bilaterally. 5. A 2.1 cm low-attenuation thyroid nodule in the right lobe. Based on bench marked recommendation below, a thyroid ultrasound should be considered when the patient is able. However, a history of lung carcinoma has been reported, clinical correlation is recommended RECOMMENDATIONS: Managing Incidental Thyroid Nodule Detected at CT or MRI or US 1. Further evaluation by thyroid Ultrasound recommended for these incidental nodules: Patient Age 25 years or less - Nodule of any size Patient Age 21-27 years old - Nodule 1 cm in size or greater Patient Age 28 years or more - Nodule 1.5 cm in size or greater 2. Follow up thyroid ultrasound also recommend in these scenarios - Solitary nodule with high risk imaging features (locally invasive nodule or suspicious lymph nodes) - Heterogeneous, enlarged thyroid gland. - Increased uptake on PET 3. No further imaging is recommended in the following scenarios - Any nodule not meeting above criteria. - Those patients with limited life expectancy or significant co-morbidities. Note: These recommendations do not apply to pts. w/ increased risk for thyroid cancer or pts. with symptomatic thyroid disease. ________________________________________________________________ Recommendations for f/u of Incidental Thyroid Nodules (ITN) found on CT, MR, NM and Extrathyroidal US are based upon the ACR white paper and [facility] 3-tiered system for managing ITNs: J Am Romario Radiol. 2015 Feb;12(2): 143-50     Ct Cervical Spine Wo Contrast    Result Date: 8/11/2018  EXAMINATION: CT OF THE CERVICAL SPINE WITHOUT CONTRAST 8/11/2018 4:34 pm TECHNIQUE: CT of the cervical spine was performed without the administration of intravenous contrast. Multiplanar reformatted images are provided for review.  Dose modulation, iterative reconstruction, and/or iterative reconstruction, and/or weight based adjustment of the mA/kV was utilized to reduce the radiation dose to as low as reasonably achievable. COMPARISON: None HISTORY: ORDERING SYSTEM PROVIDED HISTORY: trauma TECHNOLOGIST PROVIDED HISTORY: Reason for exam:->trauma FINDINGS: BONES/ALIGNMENT: There is normal alignment of the spine. The vertebral body heights are maintained. No osseous destructive lesion is seen. DEGENERATIVE CHANGES: Multilevel degenerative changes with canal stenosis at L2-L3 due to disc bulging combined with ligamentum flava thickening and facet joint arthropathy. SOFT TISSUES/RETROPERITONEUM: No paraspinal mass is seen. 1. No acute lumbar spine abnormality 2. Multilevel degenerative changes, evidence for canal stenosis at L2-L3     Ct Abdomen Pelvis W Iv Contrast Additional Contrast? None    Result Date: 8/11/2018  EXAMINATION: CT OF THE ABDOMEN AND PELVIS WITH CONTRAST 8/11/2018 4:50 pm TECHNIQUE: CT of the abdomen and pelvis was performed with the administration of intravenous contrast. Multiplanar reformatted images are provided for review. Dose modulation, iterative reconstruction, and/or weight based adjustment of the mA/kV was utilized to reduce the radiation dose to as low as reasonably achievable. COMPARISON: None. HISTORY: ORDERING SYSTEM PROVIDED HISTORY: trauma TECHNOLOGIST PROVIDED HISTORY: Additional Contrast?->None FINDINGS: Lower Chest: Described on the chest CT Organs: Biliary gas noted. No other focal hepatic abnormality. The gallbladder appears normal.  The kidneys, adrenal glands, spleen and pancreas appear unremarkable. GI/Bowel: Colonic diverticulosis. No evidence for diverticulitis. Normal appearing appendix. The bowel loops are not dilated. No focal bowel wall thickening. Pelvis: There is small amount of free fluid. Conde catheter in the bladder. No bladder masses.  Peritoneum/Retroperitoneum: Small amount of free abdominal fluid adjacent to the liver and spleen. Atherosclerotic changes. No adenopathy. No extraluminal gas. Bones/Soft Tissues: No soft tissue hernia. No acute fractures. No lytic or blastic lesions. Degenerative changes throughout the spine. 1. Small amount of free fluid in the abdomen and pelvis. However, no solid organ injury is appreciated 2. Biliary gas noted. No evidence for previous surgery. An incompetent sphincter may be responsible for this. 3. No solid organ injury identified 4. Colonic diverticulosis without evidence for diverticulitis     Xr Chest Portable    Result Date: 8/11/2018  EXAMINATION: SINGLE XRAY VIEW OF THE CHEST 8/11/2018 3:20 pm COMPARISON: None. HISTORY: ORDERING SYSTEM PROVIDED HISTORY: trauma TECHNOLOGIST PROVIDED HISTORY: Reason for exam:->trauma FINDINGS: Endotracheal to in satisfactory position. NG tube courses into the left upper quadrant. The heart size is within normal limits. No pneumothorax. Airspace opacification in the right upper lobe. Endotracheal tube in satisfactory position Airspace opacification in the right upper lobe could represent a contusion NG tube courses into the left upper quadrant     Cta Neck W Contrast    Result Date: 8/11/2018  EXAMINATION: CTA OF THE NECK 8/11/2018 4:50 pm TECHNIQUE: CTA of the neck was performed with the administration of intravenous contrast. Multiplanar reformatted images are provided for review. MIP images are provided for review. Stenosis of the internal carotid arteries measured using NASCET criteria. Dose modulation, iterative reconstruction, and/or weight based adjustment of the mA/kV was utilized to reduce the radiation dose to as low as reasonably achievable. COMPARISON: None. HISTORY: ORDERING SYSTEM PROVIDED HISTORY: trauma FINDINGS: AORTIC ARCH/ARCH VESSELS: There is a normal branch pattern of the aortic arch. No significant stenosis is seen of the innominate artery or subclavian arteries. CAROTID ARTERIES: There is no evidence of dissection.

## 2018-08-12 NOTE — CONSULTS
[facility]    Consult / H&P               Today's Date: 8/12/2018  Patient Name: Sharon Early  Date of admission: 8/11/2018  2:46 PM  Patient's age: [de-identified] y.o., 1935  Admission Dx: Fall [W19. XXXA]    Reason for Consult:  Cardiac evaluation    Requesting Physician: Papi Washington MD    CHIEF COMPLAINT:  fall    History Obtained From:   electronic medical record    HISTORY OF PRESENT ILLNESS:      The patient is a [de-identified] y.o.  male with known lung cancer  who is admitted to the hospital for fall. He came as transfer from [facility] with C2 fracture following fall. Per notes   Family reported  that patient was going down the stairs when he fell down a couple of steps hitting his head and required CPR immediately after prior to EMS arrival.  Patient had a pulse upon EMS arrival and was taken to [facility] ER where he was found to have a C2 fracture. Patient was also intubated there    He had H/O A fib and was on asa only. NS was consulted and following th epatient. Code status is [facility]    We were being consulted for the management for the A fib. Patient was in A fib when he was started on dopamine but switched to levophed and he is sinus rhythm currently with rate control. Past Medical History:   has no past medical history on file. Past Surgical History:   has no past surgical history on file. Home Medications:    Prior to Admission medications    Medication Sig Start Date End Date Taking?  Authorizing Provider   methotrexate (RHEUMATREX) 2.5 MG chemo tablet Take by mouth once a week   Yes Historical Provider, MD   folic acid (FOLVITE) 1 MG tablet Take 1 mg by mouth daily   Yes Historical Provider, MD   metoprolol succinate (TOPROL XL) 25 MG extended release tablet Take 25 mg by mouth daily   Yes Historical Provider, MD   aspirin 81 MG chewable tablet Take 81 mg by mouth daily   Yes Historical Provider, MD   vitamin D (CHOLECALCIFEROL) 1000 UNIT TABS tablet Take 1,000 Units by mouth daily   Yes Historical Provider, MD      Current Facility-Administered Medications: prothrombin complex concentrate (human) (KCENTRA) infusion 1,000 Units, 1,000 Units, Intravenous, Once  sodium chloride flush 0.9 % injection 10 mL, 10 mL, Intravenous, 2 times per day  sodium chloride flush 0.9 % injection 10 mL, 10 mL, Intravenous, PRN  acetaminophen (TYLENOL) tablet 650 mg, 650 mg, Oral, Q4H PRN  magnesium hydroxide (MILK OF MAGNESIA) 400 MG/5ML suspension 30 mL, 30 mL, Oral, Daily PRN  ondansetron (ZOFRAN) injection 4 mg, 4 mg, Intravenous, Q6H PRN  lactated ringers infusion, , Intravenous, Continuous  famotidine (PEPCID) injection 20 mg, 20 mg, Intravenous, BID  chlorhexidine (PERIDEX) 0.12 % solution 15 mL, 15 mL, Mouth/Throat, BID  fentaNYL (SUBLIMAZE) injection 50 mcg, 50 mcg, Intravenous, Q1H PRN  norepinephrine (LEVOPHED) 16 mg in dextrose 5% 250 mL infusion, 2 mcg/min, Intravenous, Continuous  bacitracin ointment, , Topical, PRN  metoprolol (LOPRESSOR) injection 2.5 mg, 2.5 mg, Intravenous, Q6H PRN    Allergies:  Patient has no known allergies. Social History:        Family History: family history is not on file. No h/o sudden cardiac death. No for premature CAD    REVIEW OF SYSTEMS:    · Not obtained because of mental status. PHYSICAL EXAM:      BP (!) 99/50   Pulse 80   Temp 98.1 °F (36.7 °C) (Oral)   Resp 19   Wt 207 lb 7.3 oz (94.1 kg)   SpO2 97%    Constitutional and General Appearance: intubated and following commands  HEENT: PERRL, no cervical lymphadenopathy. No masses palpable. Normal oral mucosa  Respiratory:  · Mechanical vent, weaning off from vent  Cardiovascular:  · The apical impulse is not displaced  · Heart tones are crisp and normal. regular S1 and S2.     Abdomen:   · No masses or tenderness  · Bowel sounds present  Extremities:  ·  No Cyanosis or Clubbing  ·  Lower extremity edema: No  ·  Skin: Warm and dry  Neurological:  · intubated    DATA:    Diagnostics:    EKG:

## 2018-08-12 NOTE — PROGRESS NOTES
Renettanisreenchristian  22.     Neurosurgery Service      Progress Note           Subjective :     No major events or new complaints through the night. Intubated . No sedation . On Dopamine drip. Eyes open to painful  and verbal stimuli . Blinks on command . Hard c-collar. bilateral chest tubes. No movements in upper extremities and lower extremities . minimally withdraws with pain in lower extremities. Objective :     Vitals:    08/12/18 0530 08/12/18 0545 08/12/18 0600 08/12/18 0615   BP:   (!) 107/52    Pulse: 81 65 64 66   Resp: 20 18 18 17   Temp:       TempSrc:       SpO2: 96% 95% 97% 96%   Weight:             Physical Examination :      Intubated . No sedation . Eyes open to painful  and verbal stimuli . Blinks on command     Head: normocephalic, R forehead abrasion  Eyes:  PERRLA +2 with sluggish response   ENT: Unremarkable. Tongue midline   Neck hard c-collar   Lungs - bilateral chest tubes . No crackles or wheezes. Cardiovascular - S1, S2, regular rate and rhythm. Abdomen - soft, non-distended, pelvic binder   Skin :  PWD. No open wounds , abrasions or contusions . No rash   Neuro-Muscular exam:  Intubated . No sedation . Eyes open to painful  and verbal stimuli . Blinks on command . GCS 3/15. Bilateral soft restraints. No movements in upper extremities and lower extremities . minimally withdraws  with pain in lower extremities. No involuntary movements or tremors. Lab Results   Component Value Date    WBC 13.0 (H) 08/12/2018    HGB 12.1 (L) 08/12/2018    HCT 37.9 (L) 08/12/2018     08/12/2018     08/12/2018    K 4.7 08/12/2018     08/12/2018    CREATININE 0.82 08/12/2018    BUN 13 08/12/2018    CO2 21 08/12/2018    INR 1.0 08/11/2018       Radiology   Ct Head Wo Contrast    Result Date: 8/11/2018    No acute intracranial abnormality.        Ct Cervical Spine Wo Contrast    Result Date: 8/11/2018    Fracture of the base of the dens with 1 cm displacement of the dens posteriorly on the body of C2. Findings were discussed with Dr Fernando Colon at 4:57 pm on 8/11/2018. Cta Neck W Contrast    Result Date: 8/11/2018    No cervical arterial injury. Moderate proximal right internal carotid artery stenosis. Ct Thoracic Spine Wo Contrast    Result Date: 8/11/2018    1. No acute thoracic spine abnormality 2. Multilevel degenerative changes     Ct Lumbar Spine Wo Contrast    Result Date: 8/11/2018    1. No acute lumbar spine abnormality 2. Multilevel degenerative changes, evidence for canal stenosis at L2-L3     Ct Chest W Contrast     1. No aortic injury. 2. Consolidation in the left lower lobe and right upper lobe could represent pulmonary contusions. Alternatively, this may be related to malignancy as a history of lung carcinoma has been reported. 3. Bilateral chest tubes in place. No pneumothorax. 4. Fractures of the 2nd through 7th ribs anteriorly, bilaterally. 5. A 2.1 cm low-attenuation thyroid nodule in the right lobe. Based on bench marked recommendation below, a thyroid ultrasound should be considered when the patient is able. However, a history of lung carcinoma has been reported, clinical correlation is recommended     Ct Abdomen Pelvis W Iv Contrast Additional Contrast    Result Date: 8/11/2018    1. Small amount of free fluid in the abdomen and pelvis. However, no solid organ injury is appreciated 2. Biliary gas noted. No evidence for previous surgery. An incompetent sphincter may be responsible for this. 3. No solid organ injury identified 4. Colonic diverticulosis without evidence for diverticulitis       ASSESSMENT & PLAN:      Mr. Tatianna Karimi is  an [de-identified]year-old  male with unstable C2 fx and L gaze deviation s/p Fall w/ LOC on 8/11/18. He also has bilateral 2-7 ribs fracture .     Family does not want to proceed with any form of surgical intervention at this time due to diagnosis of lung cancer for which the patient refused treatment and dementia. Neurosurgery to sign off. Maintain Cervical spine Immobilization with hard collar ( Aspen/Maimi-J) at all times. Recommend palliative care consult. Please contact neurosurgery with any questions or concerns. Caio Linder .  CNP  Neurosurgery    Cell : Cell [phone]  pager [phone]

## 2018-08-12 NOTE — PROGRESS NOTES
Nutrition Assessment    Type and Reason for Visit: Initial (Vent Check)    Nutrition Recommendations:   - Continue NPO - will provide recommendations for nutrition support as requested. - Monitor for plan of care. Malnutrition Assessment:  · Malnutrition Status: Insufficient data    Nutrition Diagnosis:   · Problem: Inadequate oral intake  · Etiology: related to Impaired respiratory function-inability to consume food     Signs and symptoms:  as evidenced by Intubation, NPO status due to medical condition    Nutrition Assessment:  · Subjective Assessment: Pt currently intubated. Admitted s/p a fall with C1 and C2 fractures. · Wound Type: None  · Current Nutrition Therapies:  · Oral Diet Orders: NPO   · Anthropometric Measures:  · Ht:  (n/a)   · Current Body Wt: 207 lb 7.3 oz (94.1 kg)  · Comparative Standards (Estimated Nutrition Needs):  · Estimated Daily Total Kcal: 5979-0879 kcal  · Estimated Daily Protein (g):  gm protein    Estimated Intake vs Estimated Needs: Intake Less Than Needs    Nutrition Risk Level: High    Nutrition Interventions:   Continue NPO - Monitor for plan of care. Continued Inpatient Monitoring, Education Not Indicated    Nutrition Evaluation:   · Evaluation: Goals set   · Goals: Meet % of estimated nutrition needs. · Monitoring: NPO Status, Skin Integrity, Fluid Balance, Weight, Comparative Standards, Pertinent Labs    See Adult Nutrition Doc Flowsheet for more detail.      Electronically signed by Emily Martin RD, LD on 8/12/18 at 4:10 PM    Contact Number: [phone]

## 2018-08-13 PROBLEM — J96.00 ACUTE RESPIRATORY FAILURE (HCC): Status: ACTIVE | Noted: 2018-01-01

## 2018-08-13 PROBLEM — S12.100A C2 CERVICAL FRACTURE (HCC): Status: ACTIVE | Noted: 2018-01-01

## 2018-08-13 PROBLEM — I46.8 CARDIAC ARREST DUE TO TRAUMA (HCC): Status: ACTIVE | Noted: 2018-01-01

## 2018-08-13 PROBLEM — S22.43XA FRACTURE OF MULTIPLE RIBS OF BOTH SIDES: Status: ACTIVE | Noted: 2018-01-01

## 2018-08-13 NOTE — PROGRESS NOTES
DEATH NOTE    PATIENT NAME: Gael Jasso  YOB: 1935  MEDICAL RECORD NO. [id number]  DATE: 8/13/2018  PRIMARY CARE PHYSICIAN: Rocio Loera MD    DIAGNOSIS OF DEATH     I have confirmed the death of this patient in accordance with accepted medical standards.   The patient is dead as evidenced by cardiac death or cessation of brain function:    Cardiac Death (check all that apply):     [x]  Absence of respiratory effort by observation      [x]  Absence of pulse by palpation     []  Absence of blood pressure by sphygmomanometry     []  Absence of sustainable cardiac rhythm by monitor    Death by Cessation of Brain Function (check all that apply):     []  Absence of Cerebral Function with no motor response     []  Absence of brain stem function by systemic physical exam     []  Failure to respond with respiratory drive by apnea test     Additional, optional, confirmatory tests     []  Cerebral Electrical silence as interpreted by qualified reader     []  Absence of brain blood flow by radiologic technique      CERTIFICATION OF DEATH     I have pronounced the patient dead on:     Date: 8/13/2018 at 2:15 PM     NOTIFICATIONS     Attending physician (name) notified: Dr. Modesto Dos Santos                                                          []  Per Nursing    Family notified: Name and/or Relationship: Wife at bedside                                                          []  Per Nursing     notified (Name):                                                           [x]  Per Bakari Simon MD  8/13/2018, 2:15 PM

## 2018-08-13 NOTE — FLOWSHEET NOTE
[facility] SPIRITUAL CARE DEPARTMENT - [facility]  PROGRESS NOTE    Shift date: 8/13/18  Shift day: Monday   Shift # 1    Room # 0105/0105-01   Name: Loree Antnoio            Age: [de-identified] y.o. Gender: male          [employer]: Estev[address]ul 35 [employer]: [place], [place]    Referral: Code Status Change to [address] Date & Time: 8/11/2018  2:46 PM    PATIENT/EVENT DESCRIPTION:  Family requested that  be called in to give Last Rites to patient as they were planning to terminally extubate this afternoon. SPIRITUAL ASSESSMENT/INTERVENTION:    contacted Fr. Audrey Sesay who stated that he could arrive to the hospital between 1:00pm and 1:30pm to anoint patient.  attended family conference with doctor and nurse. Family agreed to wait for  to arrive before officially changing the code status to [facility].  provided a supportive presence as family stood at bedside. SPIRITUAL CARE FOLLOW-UP PLAN:  Chaplains to remain available for further support of family as they continue with the extubation process. Electronically signed by John Joe Resident, on 8/13/2018 at 12:23 PM.  [address]  [phone]       08/13/18 1221   Encounter Summary   Services provided to: Family   Referral/Consult From: Nurse   Continue Visiting (8/13/18)   Complexity of Encounter Moderate   Length of Encounter 30 minutes   Grief and Life Adjustment   Type Palliative care   Assessment Approachable;Tearful;Coping   Intervention Active listening;Explored feelings, thoughts, concerns;Contacted support as requested per patient/family request   Who?    Why?  Last Rites   At Request Of Wife   Outcome Expressed gratitude;Expressed feelings/needs/concerns;Coping;Receptive

## 2018-08-13 NOTE — CARE COORDINATION
Discharge [facility] Case Management Department  Written by: Edith Abdul RN    Patient Name: Albertina Du  Attending Provider: Piyush Murphy MD  Admit Date: 2018  2:46 PM  MRN: [id number]  Account: [de-identified]                     : 1935  Discharge Date:       Disposition:     Edith Abdul RN

## 2018-08-13 NOTE — OP NOTE
[facility]                                 [facility]ATIVE REPORT    PATIENT NAME: Isai Lebron                     :        1935  MED REC NO:   [id number]                             ROOM:       0105  ACCOUNT NO:   [de-identified]                           ADMIT DATE: 2018  PROVIDER:     Eleazar Oliveira    DATE OF PROCEDURE:  2018    ATTENDING PHYSICIAN:  Nevaeh Joseph MD    PREOPERATIVE DIAGNOSIS:  Acute blood loss anemia. POSTOPERATIVE DIAGNOSIS:  Acute blood loss anemia. PROCEDURE:  Diagnostic peritoneal lavage. SURGEON:  Nevaeh Joseph MD    ASSISTANT Tello Brice DO    ESTIMATED BLOOD LOSS:  5 mL. INDICATIONS:  The patient is an 70-year-old male who presented to the  trauma bay as a trauma. He had a GCS of 3 and arrived intubated. He was  not moving any extremities and was found to be significantly hypotensive in  the trauma bay. A FAST exam was performed, which was negative. Bilateral  chest tubes were placed which showed a minor hemothorax on the left, but  did not explain his hypotension. His hypotension persisted after the chest  tubes were placed. The decision was then made to ensure that there was no  hemoperitoneum and so we proceeded with a DPL. This was emergent in  nature, so emergency exception was utilized. DESCRIPTION OF PROCEDURE:  The procedure was done in the trauma bay as an  emergent procedure. The abdomen was prepped and then toweled off, and  field sterility was maintained throughout the entire case. A sterile nielsen catheter had been placed. An OG tube was in place. The patient was intubated and sedated. An incision was  made horizontally just above the umbilicus. The underlying tissues were  dissected with blunt dissection down to the anterior rectus fascia.   A stay  suture was placed on both sides of the midline on the anterior rectus  fascia, and these were used to retract the fascia into the air. The fascia  was then opened with sharp dissection, and the peritoneal cavity was  entered safely. There was no gross blood on entrance into the abdominal  cavity. A catheter was then inserted into the peritoneal cavity, and 1 L  of warm normal saline was irrigated into the abdomen. When this was  complete, we then drained this fluid out of the abdomen, which returned  clear liquids, and there was no evidence of any blood or hemoperitoneum. The catheter was then removed and the 2 stay sutures on both sides of the  fascia were then tied together to close the fascia. One additional suture  was placed in a simple interrupted fashion over the fascia to securely  reapproximate the tissues. The lateral edges of the skin incision were  then closed with staples in the midline, and the incision was packed, and a  sterile dressing was applied. The patient was given Ancef. The patient  tolerated the procedure well and was eventually taken to the surgical ICU  in critical condition. Dr. Miles Franco was present throughout the entire case.         [facility]    D: 08/11/2018 21:35:00       T: 08/11/2018 21:37:34     [email or web address]  Job#: [id number]     Doc#: [id number]    CC:

## 2018-08-13 NOTE — PROGRESS NOTES
ICU PROGRESS NOTE      PATIENT NAME: Bishop Torrez RECORD NO. [id number]  DATE: 2018    PRIMARY CARE PHYSICIAN: Jewell Jeong MD    HD: # 2    ASSESSMENT    Patient Active Problem List   Diagnosis    Fall     Fall standing height +LOC, unstable C2 fx and L gaze deviation, ASA daily, Hx afib and lung cancer and dementia  S/p DPL, b/l chest tubes in trauma bay  B/l rib fx 2-7 anteriorly      MEDICAL DECISION MAKING AND PLAN     1. Neuro- pain control fentanyl 50mcg Q1h PRN,               -NS: TLS clear, maintain c-spine in hard collar at all times, NS s/o due to family not wanting any surgical intervention   2. CV- NSR with one bout of a-fib noted, troponin's x3 negative, cardiology recommends for lopressor IV PRN and PO if extubated for a-fib,  Echo pending   3. Pulm- 30%/5/18/570, Abg 7.464/36.6/68/26.2, continue to wean, b/l Chest tubes with minimal output, L 0ml, R 75ml last 12 hrs, no air leak for b/l tubes, CXR no pneumothorax mild airspace disease RUL and LLL  4. GI- s/p DPL negative, NPO, OGT LIWS, IV pepcid  5. Renal- 75ml/hr LR, replace electrolytes PRN, CK/TESFAYE stable, Cr .75, K+4.3 , low UOP .3ml/kg/hr   6. Heme- 9.2 WBC, Hgb 10.4, Tmax 37.1, received ancef 2g, received K centra, s/p 3uPRBC, 4uFFP  7. SCDs while in bed  8. Dispo- continued family discussion, pt was previously a DNR CC pt and has known lung Ca and opted for no treatment, DNR-CCA at this time per pt's POA/wife, will discuss more today on code status with family/poa  9. Palliative care following- appreciate input               CHECKLIST     RASS: -3  RESTRAINTS: b/l wrist soft  IVF: LR  NUTRITION: none  ANTIBIOTICS: none  GI: pepcid   DVT: none  GLYCEMIC CONTROL: none  HOB >45: no, 30 degrees per NS  SUBJECTIVE    Derenda Presto  Seen and examined. Unchanged from yesterday. Does not follow commands.  No acute events overnight per RN, UOP marginal      OBJECTIVE  VITALS: Temp: Temp: 98.8 °F (37.1 °C)Temp  Av.6 °F (37 °C)  Min: 98.1 °F (36.7 °C)  Max: 98.9 °F (84.2 °C) BP Systolic (13QJM), YW , Min:97 , TRN:694   Diastolic (85DYE), IZZY:70, Min:47, Max:77   Pulse Pulse  Av.4  Min: 58  Max: 80 Resp Resp  Av.2  Min: 0  Max: 34 Pulse ox SpO2  Av %  Min: 93 %  Max: 99 %    GENERAL: intubated  NEURO: positive findings: opens eyes to voice, does not follow commands, LE spontaneuos movement, no purposeful movement   HEENT: Eye: positive findings: EOMI, PERRLA  : nielsen intact  LUNGS: mechanically vented, clear to ausculation, without wheezes, rales or rhonci, b/l CT in place, good seal and no leak noted   HEART: normal rate and regular rhythm  ABDOMEN: soft, non-tender, non-distended, bowel sounds present in all 4 quadrants and no guarding or peritoneal signs present  EXTERMITY: no cyanosis, clubbing or edema    LAB:  CBC:   Recent Labs      18   1521  18   0534  18   0424   WBC  13.3*  13.0*  9.2   HGB  11.2*  12.1*  10.4*   HCT  35.7*  37.9*  32.9*   MCV  95.2  91.3  91.1   PLT  174  150  102*     BMP:   Recent Labs      18   1521  18   0534  18   0424   NA  136  139  141   K  4.8  4.7  4.3   CL  103  106  108*   CO2  24  21  25   BUN  8  13  20   CREATININE  0.94  0.82  0.75   GLUCOSE  110*  148*  120*         RADIOLOGY:  Ct Head Wo Contrast    Result Date: 2018  EXAMINATION: CT OF THE HEAD WITHOUT CONTRAST  2018 3:12 pm TECHNIQUE: CT of the head was performed without the administration of intravenous contrast. Dose modulation, iterative reconstruction, and/or weight based adjustment of the mA/kV was utilized to reduce the radiation dose to as low as reasonably achievable. COMPARISON: None. HISTORY: ORDERING SYSTEM PROVIDED HISTORY: trauma TECHNOLOGIST PROVIDED HISTORY: Has a \"code stroke\" or \"stroke alert\" been called? ->No FINDINGS: BRAIN/VENTRICLES: The ventricles and sulci are diffusely enlarged.   Low attenuation is seen in the periventricular and subcortical white matter. No acute intracranial hemorrhage or acute infarct is identified. ORBITS: The visualized portion of the orbits demonstrate no acute abnormality. SINUSES: The visualized paranasal sinuses and mastoid air cells demonstrate no acute abnormality. SOFT TISSUES/SKULL:  Hematoma overlying the right parietal bone. No acute intracranial abnormality. Ct Chest W Contrast    Result Date: 8/11/2018  EXAMINATION: CT OF THE CHEST WITH CONTRAST 8/11/2018 4:50 pm TECHNIQUE: CT of the chest was performed with the administration of intravenous contrast. Multiplanar reformatted images are provided for review. Dose modulation, iterative reconstruction, and/or weight based adjustment of the mA/kV was utilized to reduce the radiation dose to as low as reasonably achievable. COMPARISON: None. HISTORY: ORDERING SYSTEM PROVIDED HISTORY: trauma FINDINGS: Mediastinum:  A 2.1 cm low-attenuation right thyroid lobe nodule. No mediastinal or hilar masses. The thoracic aorta is intact. Atherosclerotic changes. No abnormal fluid collections seen in the mediastinum. No pericardial effusion. Lungs/pleura: Emphysematous changes seen in both lungs. Bilateral chest tubes in place. No pneumothorax. Consolidation noted in the left lower lobe and in the right upper lobe. No endobronchial lesion. No pleural effusion. Upper Abdomen: Will be described on the CT of the abdomen. Soft Tissues/Bones: Fractures of the right 2nd through 7th ribs anteriorly. Fractures of the left 2nd through 7th ribs anteriorly as well. Degenerative changes throughout the spine. No spinal fractures. 1. No aortic injury. 2. Consolidation in the left lower lobe and right upper lobe could represent pulmonary contusions. Alternatively, this may be related to malignancy as a history of lung carcinoma has been reported. 3. Bilateral chest tubes in place. No pneumothorax. 4. Fractures of the 2nd through 7th ribs anteriorly, bilaterally.  5. A 2.1 cm low-attenuation thyroid nodule in the right lobe. Based on bench marked recommendation below, a thyroid ultrasound should be considered when the patient is able. However, a history of lung carcinoma has been reported, clinical correlation is recommended RECOMMENDATIONS: Managing Incidental Thyroid Nodule Detected at CT or MRI or US 1. Further evaluation by thyroid Ultrasound recommended for these incidental nodules: Patient Age 25 years or less - Nodule of any size Patient Age 21-27 years old - Nodule 1 cm in size or greater Patient Age 28 years or more - Nodule 1.5 cm in size or greater 2. Follow up thyroid ultrasound also recommend in these scenarios - Solitary nodule with high risk imaging features (locally invasive nodule or suspicious lymph nodes) - Heterogeneous, enlarged thyroid gland. - Increased uptake on PET 3. No further imaging is recommended in the following scenarios - Any nodule not meeting above criteria. - Those patients with limited life expectancy or significant co-morbidities. Note: These recommendations do not apply to pts. w/ increased risk for thyroid cancer or pts. with symptomatic thyroid disease. ________________________________________________________________ Recommendations for f/u of Incidental Thyroid Nodules (ITN) found on CT, MR, NM and Extrathyroidal US are based upon the ACR white paper and [facility] 3-tiered system for managing ITNs: J Am Romario Radiol. 2015 Feb;12(2): 143-50     Ct Cervical Spine Wo Contrast    Result Date: 8/11/2018  EXAMINATION: CT OF THE CERVICAL SPINE WITHOUT CONTRAST 8/11/2018 4:34 pm TECHNIQUE: CT of the cervical spine was performed without the administration of intravenous contrast. Multiplanar reformatted images are provided for review. Dose modulation, iterative reconstruction, and/or weight based adjustment of the mA/kV was utilized to reduce the radiation dose to as low as reasonably achievable. COMPARISON: None.  HISTORY: ORDERING SYSTEM PROVIDED HISTORY:

## 2018-08-13 NOTE — PATIENT CARE CONFERENCE
Palliative Care Family Conference    Patient: Nadnini Shukla  Room: 0105/0105-01    Attended By: Dr. Therese Fraire care attending, Dr. Chris Ruth primary care team resident, patient's nurse Monserrat Valenzuela, patient's wife [place] and patient's children, grandchildren and other family members    Reason for meeting:Routine meeting  Disease progression  Functional decline  Discuss goals of care  Treatment options/plans  Provide clinical updates and answer questions  Share information and provider education for the family  Listen to patient/family concerns  Assess family understanding, concerns, and coping  Poor prognosis is anticipated  Accomplish end of life planning  Provide emotional support to the family  Other major care decisions  Code status     Conference Summary:    - The meeting was held in the conference room in the MICU on the 1st floor    - The meeting was conducted by me, Dr. Chris Ruth and attended by patient's nurse Monserrat Valenzuela, patient's wife [place], patient's children and grandchildren and other family members    - Dr. Chris Ruth explained to the family about patient's current medical conditions and discussed with them regarding the patient's grave prognosis    - Dr. Chris Ruth told the family that the patient was not a good candidate for neurosurgery and discussed with them regarding trach/PEG tube placement    - The patient's wife and family told that the patient's wishes were to not to have any aggressive measures to be done to prolong his life    - I explained the family regarding the terminal extubation and also discussed about comfort care measures with them in detail    - I discussed with the family about the different types of code status and the patient's wife [place] and other family members wanted the patients code status to be changed to comfort care    - I told the family that the terminal extubation will be done when the family was ready    - I assured the family that all comfort care medications will be ordered for the patient    - We offered comfort and emotional support to the family       Conclusion/Plan:    - Patient's code status was changed to DNR CC    - All comfort care measures were ordered as follows -    -  Terminal extubation to be done when family is ready    - Morphine 1 mg IV every 1 hours when necessary for mild pain  - Morphine 2 mg IV every 1 hour when necessary for moderate pain  - Morphine 4 mg IV every 1 hour when necessary for severe pain  - Ativan 0.5 mg IV every 1 hour when necessary for anxiety and restlessness  - Ativan 1 mg IV every 1 hour when necessary for increased anxiety and restlessness  - Glycopyrrolate 0.1 mg IV every 4 hours when necessary for secretions  - Atropine 1% ophthalmic drops, 2 drops SL when necessary for increased secretions  - Oxygen 3 L via nasal cannula, for comfort  - Soft oral suctioning as needed  - Repositioning as needed    The total time spent in face-to-face family meeting discussing goals of care and code status was 30 minutes. The note has been dictated by dragon, typing errors may be a possibility.        Electronically signed by   Asia Martinez MD  Palliative Care Team  on 8/13/2018 at 12:10 PM

## 2018-08-13 NOTE — CONSULTS
Palliative Care Inpatient Consult    NAME:  Madison Malagon  MEDICAL RECORD NUMBER:  [id number]  AGE: [de-identified] y.o. GENDER: male  : 1935  TODAY'S DATE:  2018    Reasons for Consultation:    Symptom and/or pain management  Provision of information regarding PC and/or hospice philosophies  Complex, time-intensive communication and interdisciplinary psychosocial support  Clarification of goals of care and/or assistance with difficult decision-making  Guidance in regards to resources and transition(s)    Members of PC team contributing to this consultation are :  Dr. Prachi Avila palliative care attending  History of Present Illness     The patient is a [de-identified] y.o. Non-/non  male who presents with No chief complaint on file. Referred to Palliative Care by   [x] Physician   [] Nursing  [] Family Request   [] Other:       He was admitted to the Trauma/S ICU service for Fall [W19. XXXA]. His hospital course has been associated with <principal problem not specified>. The patient has a complicated medical history and has been hospitalized since 2018  2:46 PM. The patient was transferred from [address] [place] [address] to [place] for further management of care. The patient had a fall, fell down a couple of steps hitting his head and required CPR immediately after provide to EMS arrival. Patient had a pulse upon EMS arrival and was taken to [address] [place] [address] ER. The patient was found to have C2 fracture and was also intubated there. The patient has known history of having lung cancer, A. fib and was on aspirin only. The patient's code status was changed to DNR CCA care. Palliative care was consulted for discussing further goals of care and code status. Active Hospital Problems    Diagnosis Date Noted    Fall [W19. XXXA] 2018       PAST MEDICAL HISTORY  No past medical history on file. PAST SURGICAL HISTORY  No past surgical history on file.     SOCIAL HISTORY  Social History   Substance Use Topics    following commands   Skin: Normal turgor, no bleeding, no bruising     Palliative Performance Scale:  ___60%  Ambulation reduced; Significant disease; Can't do hobbies/housework; intake normal or reduced; occasional assist; LOC full/confusion  ___50%  Mainly sit/lie; Extensive disease; Can't do any work; Considerable assist; intake normal or reduced; LOC full/confusion  ___40%  Mainly in bed; Extensive disease; Mainly assist; intake normal or reduced; LOC full/confusion   ___30%  Bed Bound; Extensive disease; Total care; intake reduced; LOCfull/confusion  __x_20%  Bed Bound; Extensive disease; Total care; intake minimal; Drowsy/coma  ___10%  Bed Bound; Extensive disease;  Total care; Mouth care only; Drowsy/coma  ___0       Death      Plan      Palliative Interaction:    - The patient was seen today and he remained intubated, unresponsive and not following commands, off sedation    - Patient's son Trev Khan and grandchildren were present in the patient's room    - I discussed the patient's current medical conditions with patient's son Trev Khan and he stated that patient's wife - his mother and other siblings were on their way to the hospital    - [place] wanted to discuss patient's current medical conditions when patient's wife and all family members when they were here    - I informed Trev Khan that the family meeting can be done with the primary care team once the family was here    - I met with patient's nurse Peggy Moreno and also met with Dr. Des Burnett primary care team resident and discussed patient's current medical conditions with him and also informed both patient's nurse Peggy Moreno and Dr. Des Burnett regarding the family meeting once the family was here    - I offered comfort and emotional support to the family    Education/support to staff  Education/support to family  Communications with primary service  Caregiver support/education     Principle Problem/Diagnosis:  Fall     Additional Assessments:   Cervical spine fracture C2  Acute respiratory

## 2018-08-13 NOTE — FLOWSHEET NOTE
[facility] [address]   Patient Death Note  DEATH   Shift date: 18    Shift day: Monday   Shift # 1                 Room # 0105/0105-01   Name: Nandini Shukla            Age: [de-identified] y.o. Gender: male          Ori[employer] orthodox: [address] of [employer]: [place], [place]  Admit Date & Time: 2018  2:46 PM     Referral: Nurse Monserrat Valenzuela  Actual date of death: 18   TOD: 2:15 pm       SITUATION AT DEATH:  Patient was extubated and  shortly after with family present. IS THIS A 'S CASE? Yes    SPIRITUAL/EMOTIONAL INTERVENTION:  Prior to extubation, Fr. Carrie Elena arrived to give patient Last Rites per wife's request. Patient  shortly after extubation.  provided a supportive and listening presence as wife spoke about their 64 years of marriage and how life will be much different now. Wife and family expressed their gratefulness of the strong family they have; wife gave credit for that to the patient for being a great father and .  assisted family and nurse with release of body form. DOCTOR SIGNING DEATH NOTE:       spoke with [facility] , who will contact the  home    Copy of COMPLETED Release of Body Form Received? Yes       HOME:  Name: [place]  Phone Number: [phone]    NEXT OF KIN:  Name: Leticia Lax \"Gilda\" David Matters  Relationship: Wife  Street Address: [address] [place] [address] Road: [address]  Zip code: [address]   Phone Number: [phone]    [facility]? No    IF SO, WHAT?   None    Electronically signed by Tremaine Cano Resident, on 2018 at 2:30 PM.  [address]  [phone]       18 1573   Encounter Summary   Services provided to: Family   Referral/Consult From: Nurse   Continue Visiting (18)   Complexity of Encounter Moderate   Length of Encounter 30 minutes   Grief and

## 2018-08-13 NOTE — PLAN OF CARE
Problem: MECHANICAL VENTILATION  Goal: Patient will maintain patent airway  Outcome: Ongoing  Problem: OXYGENATION/RESPIRATORY FUNCTION  Goal: Patient will maintain patent airway  Outcome: Ongoing  Goal: Patient will achieve/maintain normal respiratory rate/effort  Respiratory rate and effort will be within normal limits for the patient  Outcome: Ongoing    Problem: MECHANICAL VENTILATION  Goal: Patient will maintain patent airway  Outcome: Ongoing  Goal: Oral health is maintained or improved  Outcome: Ongoing  Goal: ET tube will be managed safely  Outcome: Ongoing  Goal: Ability to express needs and understand communication  Outcome: Ongoing  Goal: Mobility/activity is maintained at optimum level for patient  Outcome: Ongoing    Problem: ASPIRATION PRECAUTIONS  Goal: Patients risk of aspiration is minimized  Outcome: Ongoing    Problem: SKIN INTEGRITY  Goal: Skin integrity is maintained or improved  Outcome: Ongoing

## 2018-08-13 NOTE — DISCHARGE SUMMARY
Death Note:    PATIENT NAME:  Khoa Whitlock  YOB: 1935  MEDICAL RECORD NO. [id number]  DATE: 18  PRIMARY CARE PHYSICIAN: Farhat Smith MD  Time/Date of Death:  18  DISPOSITION:      ADMITTING DIAGNOSIS:   Fall- C2 cervical fracture     DIAGNOSIS:   Patient Active Problem List   Diagnosis    Fall    C2 cervical fracture ([facility] [address]    Fracture of multiple ribs of both sides    Acute respiratory failure ([facility] [address]    Cardiac arrest due to trauma ([facility] [address]    Encounter for palliative care   Hypotension due to hypovolemic shock and a component of neurogenic shock      CONSULTANTS:  NS, cardiology and palliative care    PROCEDURES: b/l chest tubes, DPL    HOSPITAL COURSE:     Fall down stairs, + LOC, intubated, transfer from [address] [place] [address] for C1-C2 fx, CPR with ROSC, on Coumadin, eyes deviated to R, no spontaneous movement    inj: Odontoid fx with posterior subluxation of C1 over C2, causing severe spinal canal stenosis; Hematoma overlying R parietal bone, Fx of 2nd thru 7th ribs b/l anteriorly ; Moderate proximal R ICA stenosis    : DPL performed in trauma bay and neg; bilat chest tubes placed   total products: 2 PRBC.  : Changed to [facility]. TOD 1415, cardiac.       PHYSICAL EXAMINATION  Prior to Expiration:       GENERAL: intubated  NEURO: positive findings: opens eyes to voice, does not follow commands, LE spontaneuos movement, no purposeful movement   HEENT: Eye: positive findings: EOMI, PERRLA  : nielsen intact  LUNGS: mechanically vented, clear to ausculation, without wheezes, rales or rhonci, b/l CT in place, good seal and no leak noted   HEART: normal rate and regular rhythm  ABDOMEN: soft, non-tender, non-distended, bowel sounds present in all 4 quadrants and no guarding or peritoneal signs present  EXTERMITY: no cyanosis, clubbing or edema      LABS:     Recent Labs      18   1521  18   0534  18   0424   WBC  13.3*  13.0*  9.2   HGB  11.2*  12.1*  10.4*   HCT 35.7*  37.9*  32.9*   PLT  174  150  102*   NA  136  139  141   K  4.8  4.7  4.3   CL  103  106  108*   CO2  24  21  25   BUN  8  13  20   CREATININE  0.94  0.82  0.75       DIAGNOSTIC TESTS:    Ct Head Wo Contrast    Result Date: 8/11/2018  EXAMINATION: CT OF THE HEAD WITHOUT CONTRAST  8/11/2018 3:12 pm TECHNIQUE: CT of the head was performed without the administration of intravenous contrast. Dose modulation, iterative reconstruction, and/or weight based adjustment of the mA/kV was utilized to reduce the radiation dose to as low as reasonably achievable. COMPARISON: None. HISTORY: ORDERING SYSTEM PROVIDED HISTORY: trauma TECHNOLOGIST PROVIDED HISTORY: Has a \"code stroke\" or \"stroke alert\" been called? ->No FINDINGS: BRAIN/VENTRICLES: The ventricles and sulci are diffusely enlarged. Low attenuation is seen in the periventricular and subcortical white matter. No acute intracranial hemorrhage or acute infarct is identified. ORBITS: The visualized portion of the orbits demonstrate no acute abnormality. SINUSES: The visualized paranasal sinuses and mastoid air cells demonstrate no acute abnormality. SOFT TISSUES/SKULL:  Hematoma overlying the right parietal bone. No acute intracranial abnormality. Ct Chest W Contrast    Result Date: 8/11/2018  EXAMINATION: CT OF THE CHEST WITH CONTRAST 8/11/2018 4:50 pm TECHNIQUE: CT of the chest was performed with the administration of intravenous contrast. Multiplanar reformatted images are provided for review. Dose modulation, iterative reconstruction, and/or weight based adjustment of the mA/kV was utilized to reduce the radiation dose to as low as reasonably achievable. COMPARISON: None. HISTORY: ORDERING SYSTEM PROVIDED HISTORY: trauma FINDINGS: Mediastinum:  A 2.1 cm low-attenuation right thyroid lobe nodule. No mediastinal or hilar masses. The thoracic aorta is intact. Atherosclerotic changes. No abnormal fluid collections seen in the mediastinum.   No pericardial achievable. COMPARISON: None. HISTORY: ORDERING SYSTEM PROVIDED HISTORY: trauma TECHNOLOGIST PROVIDED HISTORY: Additional Contrast?->None FINDINGS: Lower Chest: Described on the chest CT Organs: Biliary gas noted. No other focal hepatic abnormality. The gallbladder appears normal.  The kidneys, adrenal glands, spleen and pancreas appear unremarkable. GI/Bowel: Colonic diverticulosis. No evidence for diverticulitis. Normal appearing appendix. The bowel loops are not dilated. No focal bowel wall thickening. Pelvis: There is small amount of free fluid. Conde catheter in the bladder. No bladder masses. Peritoneum/Retroperitoneum: Small amount of free abdominal fluid adjacent to the liver and spleen. Atherosclerotic changes. No adenopathy. No extraluminal gas. Bones/Soft Tissues: No soft tissue hernia. No acute fractures. No lytic or blastic lesions. Degenerative changes throughout the spine. 1. Small amount of free fluid in the abdomen and pelvis. However, no solid organ injury is appreciated 2. Biliary gas noted. No evidence for previous surgery. An incompetent sphincter may be responsible for this. 3. No solid organ injury identified 4. Colonic diverticulosis without evidence for diverticulitis     Xr Chest Portable    Result Date: 8/13/2018  EXAMINATION: SINGLE XRAY VIEW OF THE CHEST 8/12/2018 6:01 am COMPARISON: 08/11/2018 HISTORY: ORDERING SYSTEM PROVIDED HISTORY: Chest tubes, intubated TECHNOLOGIST PROVIDED HISTORY: Reason for exam:->Chest tubes, intubated FINDINGS: Endotracheal tube, enteric tube, and bilateral chest tubes remain in unchanged positioning. Cardiac silhouette is normal in size. Dense airspace opacities within the right upper lobe, left upper lobe, left perihilar region remain unchanged. No obvious superimposed pleural effusion or pneumothorax. Support tubes and lines in grossly unchanged position. Multifocal airspace disease remains unchanged.      Xr Chest lymph node is identified. There is no soft tissue extravasation of contrast. BONES: Odontoid fracture with posterior subluxation of C1 over C2, causing severe spinal canal stenosis is redemonstrated. No cervical arterial injury. Moderate proximal right internal carotid artery stenosis. Electronically signed by Margi Saha DO on 8/13/2018 at 4:29 PM                Trauma Attending Attestation      I have reviewed the above [facility] note(s) and confirmed the key elements of the medical history and physical exam. I have seen and examined the pt. I have discussed the findings, established the care plan and recommendations with Resident, [facility] RN, bedside nurse.         Gopal eFlix DO  8/14/2018  5:11 PM

## 2018-08-13 NOTE — PROGRESS NOTES
Family meeting held with Dr. Aga Valdez, Palliative care physician,  and Lucrecia Jo, GASTON. POA and many other family members were in attendance. We reviewed the pt's hospital course and current Dxs and over all out look. Family has decided to make the pt DNR-CC. Family support offered. Will follow family's wishes.     Thank you,     Electronically signed by Héctor Montoya DO on 8/13/2018 at 12:12 PM

## 2018-08-14 LAB
BLD PROD TYP BPU: NORMAL
DISPENSE STATUS BLOOD BANK: NORMAL
TRANSFUSION STATUS: NORMAL
UNIT DIVISION: 0
UNIT NUMBER: NORMAL

## 2018-08-17 NOTE — ED PROVIDER NOTES
Provider, MD   metoprolol succinate (TOPROL XL) 25 MG extended release tablet Take 25 mg by mouth daily   Yes Historical Provider, MD   aspirin 81 MG chewable tablet Take 81 mg by mouth daily   Yes Historical Provider, MD   vitamin D (CHOLECALCIFEROL) 1000 UNIT TABS tablet Take 1,000 Units by mouth daily   Yes Historical Provider, MD     REVIEW OF SYSTEMS    (2-9 systems for level 4, 10 or more for level 5)      ROS: Unable to perform due to severity of illness    PHYSICAL EXAM   (up to 7 for level 4, 8 or more for level 5)      INITIAL VITALS:   BP (!) 118/59   Pulse 74   Temp 98.1 °F (36.7 °C) (Oral)   Resp 27   Wt 207 lb 7.3 oz (94.1 kg)   SpO2 97%     See nursing flow sheet for vital signs    Constitutional: Well developed; well-nourished; severe distress  HENT: Normocephalic   Eyes: AMY 0-2ZR (slowly reactive), Conj nl  Neck: collared, trachea midline, no jvd  Cardiovascular:  No significant murmurs distal heart tones  Pulmonary/Chest Wall: diminished, clear to auscultate bilaterally without wheezes, rhonchi, rales  Abdomen: Soft, non-distended  no pulsatile mass  Musculoskeletal: scattered abrasions   Neurological: unresponsive, roving eye movement  Skin: cool and pale    DIAGNOSTIC RESULTS / EMERGENCY DEPARTMENT COURSE / MDM     LABS:  Results for orders placed or performed during the hospital encounter of 08/11/18   MRSA DNA Probe, Nasal   Result Value Ref Range    Specimen Description . NASAL SWAB     MRSA, DNA, Nasal  NMRSAA     NEGATIVE:  MRSA DNA not detected by nucleic acid amplification.    Trauma Panel   Result Value Ref Range    WBC 13.3 (H) 3.5 - 11.3 k/uL    RBC 3.75 (L) 4.21 - 5.77 m/uL    Hemoglobin 11.2 (L) 13.0 - 17.0 g/dL    Hematocrit 35.7 (L) 40.7 - 50.3 %    MCV 95.2 82.6 - 102.9 fL    MCH 29.9 25.2 - 33.5 pg    MCHC 31.4 28.4 - 34.8 g/dL    RDW 15.4 (H) 11.8 - 14.4 %    Platelets 124 822 - 327 k/uL    MPV 9.9 8.1 - 13.5 fL    NRBC Automated 0.0 0.0 per 100 WBC    Sodium 136 135 - 144 FDA approved component of the Rapid TEG. TEG testing is not FDA    Heparin Therapy UNKNOWN    Urinalysis Reflex to Culture   Result Value Ref Range    Color, UA YELLOW YEL    Turbidity UA CLEAR CLEAR    Glucose, Ur NEGATIVE NEG    Bilirubin Urine NEGATIVE NEG    Ketones, Urine NEGATIVE NEG    Specific Gravity, UA 1.076 (H) 1.005 - 1.030    Urine Hgb TRACE (A) NEG    pH, UA 5.5 5.0 - 8.0    Protein, UA TRACE (A) NEG    Urobilinogen, Urine Normal NORM    Nitrite, Urine NEGATIVE NEG    Leukocyte Esterase, Urine NEGATIVE NEG    Urinalysis Comments NOT REPORTED    Urine Drug Screen   Result Value Ref Range    Amphetamine Screen, Ur NEGATIVE NEG    Barbiturate Screen, Ur NEGATIVE NEG    Benzodiazepine Screen, Urine POSITIVE (A) NEG    Cocaine Metabolite, Urine NEGATIVE NEG    Methadone Screen, Urine NEGATIVE NEG    Opiates, Urine NEGATIVE NEG    Phencyclidine, Urine NEGATIVE NEG    Propoxyphene, Urine NOT REPORTED NEG    Cannabinoid Scrn, Ur NEGATIVE NEG    Oxycodone Screen, Ur NEGATIVE NEG    Methamphetamine, Urine NOT REPORTED NEG    Tricyclic Antidepressants, Urine NOT REPORTED NEG    MDMA, Urine NOT REPORTED NEG    Buprenorphine Urine NOT REPORTED NEG    Test Information       Assay provides medical screening only.   The absence of expected drug(s) and/or   Basic Metabolic Panel w/ Reflex to MG   Result Value Ref Range    Glucose 148 (H) 70 - 99 mg/dL    BUN 13 8 - 23 mg/dL    CREATININE 0.82 0.70 - 1.20 mg/dL    Bun/Cre Ratio NOT REPORTED 9 - 20    Calcium 8.0 (L) 8.6 - 10.4 mg/dL    Sodium 139 135 - 144 mmol/L    Potassium 4.7 3.7 - 5.3 mmol/L    Chloride 106 98 - 107 mmol/L    CO2 21 20 - 31 mmol/L    Anion Gap 12 9 - 17 mmol/L    GFR Non-African American >60 >60 mL/min    GFR African American >60 >60 mL/min    GFR Comment          GFR Staging NOT REPORTED    CBC auto differential   Result Value Ref Range    WBC 13.0 (H) 3.5 - 11.3 k/uL    RBC 4.15 (L) 4.21 - 5.77 m/uL    Hemoglobin 12.1 (L) 13.0 - 17.0 g/dL COMPATIBLE     Unit Number [id number]     Product Code Leukocyte Reduced Red Cell     Unit Divison 0     Dispense Status TRANSFUSED     Transfusion Status OK TO TRANSFUSE     Crossmatch Result COMPATIBLE    Prepare Platelets (Crossmatch)   Result Value Ref Range    Unit Number [id number]     Product Code Leukocyte Reduced Irradiated Plateletpheresis     Unit Divison 0     Dispense Status DISCARDED + AUTOCLAVED     Transfusion Status OK TO TRANSFUSE    Prepare fresh frozen plasma   Result Value Ref Range    Unit Number [id number]     Product Code Fresh Plasma     Unit Divison 0     Dispense Status REL FROM [facility]     Transfusion Status OK TO TRANSFUSE     Unit Number [id number]     Product Code Fresh Plasma     Unit Divison 0     Dispense Status REL FROM [facility]     Transfusion Status OK TO TRANSFUSE     Unit Number [id number]     Product Code Fresh Plasma     Unit Divison 0     Dispense Status ISS'D ANOTH PT     Transfusion Status OK TO TRANSFUSE     Unit Number [id number]     Product Code Fresh Plasma     Unit Divison 0     Dispense Status ISS'D ANOTH PT     Transfusion Status OK TO TRANSFUSE     Unit Number [id number]     Product Code Fresh Plasma     Unit Divison 0     Dispense Status TRANSFUSED     Transfusion Status OK TO TRANSFUSE      IMPRESSION: Please see nursing sheet for exact times, medications    RADIOLOGY:  Ct Head Wo Contrast    Result Date: 8/11/2018  EXAMINATION: CT OF THE HEAD WITHOUT CONTRAST  8/11/2018 3:12 pm TECHNIQUE: CT of the head was performed without the administration of intravenous contrast. Dose modulation, iterative reconstruction, and/or weight based adjustment of the mA/kV was utilized to reduce the radiation dose to as low as reasonably achievable. COMPARISON: None. HISTORY: ORDERING SYSTEM PROVIDED HISTORY: trauma TECHNOLOGIST PROVIDED HISTORY: Has a \"code stroke\" or \"stroke alert\" been called? ->No FINDINGS: BRAIN/VENTRICLES: The ventricles and sulci are diffusely enlarged. Low attenuation is seen in the periventricular and subcortical white matter. No acute intracranial hemorrhage or acute infarct is identified. ORBITS: The visualized portion of the orbits demonstrate no acute abnormality. SINUSES: The visualized paranasal sinuses and mastoid air cells demonstrate no acute abnormality. SOFT TISSUES/SKULL:  Hematoma overlying the right parietal bone. No acute intracranial abnormality. Ct Chest W Contrast    Result Date: 8/11/2018  EXAMINATION: CT OF THE CHEST WITH CONTRAST 8/11/2018 4:50 pm TECHNIQUE: CT of the chest was performed with the administration of intravenous contrast. Multiplanar reformatted images are provided for review. Dose modulation, iterative reconstruction, and/or weight based adjustment of the mA/kV was utilized to reduce the radiation dose to as low as reasonably achievable. COMPARISON: None. HISTORY: ORDERING SYSTEM PROVIDED HISTORY: trauma FINDINGS: Mediastinum:  A 2.1 cm low-attenuation right thyroid lobe nodule. No mediastinal or hilar masses. The thoracic aorta is intact. Atherosclerotic changes. No abnormal fluid collections seen in the mediastinum. No pericardial effusion. Lungs/pleura: Emphysematous changes seen in both lungs. Bilateral chest tubes in place. No pneumothorax. Consolidation noted in the left lower lobe and in the right upper lobe. No endobronchial lesion. No pleural effusion. Upper Abdomen: Will be described on the CT of the abdomen. Soft Tissues/Bones: Fractures of the right 2nd through 7th ribs anteriorly. Fractures of the left 2nd through 7th ribs anteriorly as well. Degenerative changes throughout the spine. No spinal fractures. 1. No aortic injury. 2. Consolidation in the left lower lobe and right upper lobe could represent pulmonary contusions. Alternatively, this may be related to malignancy as a history of lung carcinoma has been reported. 3. Bilateral chest tubes in place. No pneumothorax.  4. soft tissue hernia. No acute fractures. No lytic or blastic lesions. Degenerative changes throughout the spine. 1. Small amount of free fluid in the abdomen and pelvis. However, no solid organ injury is appreciated 2. Biliary gas noted. No evidence for previous surgery. An incompetent sphincter may be responsible for this. 3. No solid organ injury identified 4. Colonic diverticulosis without evidence for diverticulitis     Xr Chest Portable    Result Date: 8/13/2018  EXAMINATION: SINGLE XRAY VIEW OF THE CHEST 8/12/2018 6:01 am COMPARISON: 08/11/2018 HISTORY: ORDERING SYSTEM PROVIDED HISTORY: Chest tubes, intubated TECHNOLOGIST PROVIDED HISTORY: Reason for exam:->Chest tubes, intubated FINDINGS: Endotracheal tube, enteric tube, and bilateral chest tubes remain in unchanged positioning. Cardiac silhouette is normal in size. Dense airspace opacities within the right upper lobe, left upper lobe, left perihilar region remain unchanged. No obvious superimposed pleural effusion or pneumothorax. Support tubes and lines in grossly unchanged position. Multifocal airspace disease remains unchanged. Xr Chest Portable    Result Date: 8/13/2018  EXAMINATION: SINGLE XRAY VIEW OF THE CHEST 8/13/2018 5:38 am COMPARISON: 08/12/2018 HISTORY: ORDERING SYSTEM PROVIDED HISTORY: Chest tubes, intubated TECHNOLOGIST PROVIDED HISTORY: Reason for exam:->Chest tubes, intubated FINDINGS: ETT and NG tube, left chest tube terminates in the hilar region and right apical chest tube remain in place. Normal cardiomediastinal silhouette. Right upper lobe focal moderate airspace consolidation unchanged. Decrease in left mid lung airspace disease around the chest tube. No pleural effusion. No pneumothorax. Bones grossly intact. 1. Stable support tubes. 2. Stable right upper lobe airspace opacity and decreasing left mid lung airspace opacities.      Xr Chest Portable    Result Date: 8/12/2018  EXAMINATION: SINGLE XRAY VIEW OF VESSELS: There is a normal branch pattern of the aortic arch. No significant stenosis is seen of the innominate artery or subclavian arteries. CAROTID ARTERIES: There is no evidence of dissection. The common carotid arteries are patent. Atherosclerotic plaque at the right carotid bifurcation causes 60% stenosis of the proximal right internal carotid artery. Plaque at the left carotid bifurcation causes 40% stenosis of the left internal carotid artery. VERTEBRAL ARTERIES: The vertebral arteries both arise from the subclavian arteries and are normal in caliber without evidence of flow limiting stenosis or dissection. SOFT TISSUES:  Findings for the thorax are reported separately, including a right lung mass. No enlarged cervical lymph node is identified. There is no soft tissue extravasation of contrast. BONES: Odontoid fracture with posterior subluxation of C1 over C2, causing severe spinal canal stenosis is redemonstrated. No cervical arterial injury. Moderate proximal right internal carotid artery stenosis. PROCEDURES:  Chest tubes, DPL, central line per Trauma team    CONSULTS:  IP CONSULT TO NEUROSURGERY  IP CONSULT TO CARDIOLOGY  IP CONSULT TO PALLIATIVE CARE  IP CONSULT TO DIETITIAN    FINAL IMPRESSION      1. Closed fracture of cervical vertebra, unspecified cervical vertebral level, initial encounter ([facility] [address]    2.  History of cardiac arrest        DISPOSITION / Norris Juan Admitted      Alexandro Garcia MD  Emergency Medicine Resident    (Please note that portions of this note were completed with a voice recognition program.  Efforts were made to edit the dictations but occasionally words are mis-transcribed.)         Alexandro Garcia MD  Resident  08/17/18 [id number]
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
